# Patient Record
Sex: MALE | Race: WHITE | NOT HISPANIC OR LATINO | Employment: OTHER | URBAN - METROPOLITAN AREA
[De-identification: names, ages, dates, MRNs, and addresses within clinical notes are randomized per-mention and may not be internally consistent; named-entity substitution may affect disease eponyms.]

---

## 2017-03-11 ENCOUNTER — TELEPHONE (OUTPATIENT)
Dept: NURSING | Facility: CLINIC | Age: 67
End: 2017-03-11

## 2017-03-11 NOTE — TELEPHONE ENCOUNTER
Call Type: Triage Call    Presenting Problem:  Diarrhea started 3/8/17.  Currently : denies  fever .  Triage Note:  Guideline Title: Diarrhea or Other Change in Bowel Habits  Recommended Disposition: Activate   Original Inclination: Did not know what to do  Override Disposition:  Intended Action: Go to Hospital / ED  Physician Contacted: No  New or worsening signs and symptoms that may indicate shock ?  YES  Physician Instructions:  Care Advice: IMMEDIATE ACTION

## 2017-03-13 ENCOUNTER — OFFICE VISIT (OUTPATIENT)
Dept: FAMILY MEDICINE | Facility: OTHER | Age: 67
End: 2017-03-13
Payer: COMMERCIAL

## 2017-03-13 ENCOUNTER — TELEPHONE (OUTPATIENT)
Dept: FAMILY MEDICINE | Facility: OTHER | Age: 67
End: 2017-03-13

## 2017-03-13 VITALS
DIASTOLIC BLOOD PRESSURE: 80 MMHG | BODY MASS INDEX: 26.37 KG/M2 | HEIGHT: 67 IN | SYSTOLIC BLOOD PRESSURE: 120 MMHG | RESPIRATION RATE: 12 BRPM | OXYGEN SATURATION: 100 % | HEART RATE: 74 BPM | WEIGHT: 168 LBS | TEMPERATURE: 97.5 F

## 2017-03-13 DIAGNOSIS — Z12.11 SPECIAL SCREENING FOR MALIGNANT NEOPLASMS, COLON: Primary | ICD-10-CM

## 2017-03-13 DIAGNOSIS — R19.7 DIARRHEA OF PRESUMED INFECTIOUS ORIGIN: ICD-10-CM

## 2017-03-13 DIAGNOSIS — A08.4 VIRAL GASTROENTERITIS: Primary | ICD-10-CM

## 2017-03-13 LAB
FLUAV+FLUBV AG SPEC QL: NEGATIVE
FLUAV+FLUBV AG SPEC QL: NORMAL
SPECIMEN SOURCE: NORMAL

## 2017-03-13 PROCEDURE — 99213 OFFICE O/P EST LOW 20 MIN: CPT | Performed by: STUDENT IN AN ORGANIZED HEALTH CARE EDUCATION/TRAINING PROGRAM

## 2017-03-13 PROCEDURE — 87804 INFLUENZA ASSAY W/OPTIC: CPT | Performed by: STUDENT IN AN ORGANIZED HEALTH CARE EDUCATION/TRAINING PROGRAM

## 2017-03-13 ASSESSMENT — PAIN SCALES - GENERAL: PAINLEVEL: NO PAIN (1)

## 2017-03-13 NOTE — TELEPHONE ENCOUNTER
Reason for call:  Other  Reason for Call: Request for an order or referral:    Order or referral being requested: [email or web address]    Date needed: as soon as possible    Has the patient been seen by the PCP for this problem? YES    Additional comments: none    Phone number Patient can be reached at:  Home number on file [phone] (home)    Best Time:  anytime    Can we leave a detailed message on this number?  YES    Call taken on 3/13/2017 at 4:00 PM by Chun Olvera

## 2017-03-13 NOTE — NURSING NOTE
"Chief Complaint   Patient presents with     Flu Symptoms       Initial /80 (BP Location: Left arm, Patient Position: Chair, Cuff Size: Adult Regular)  Pulse 74  Temp 97.5  F (36.4  C) (Temporal)  Resp 12  Ht 5' 7.44\" (1.713 m)  Wt 168 lb (76.2 kg)  SpO2 100%  BMI 25.97 kg/m2 Estimated body mass index is 25.97 kg/(m^2) as calculated from the following:    Height as of this encounter: 5' 7.44\" (1.713 m).    Weight as of this encounter: 168 lb (76.2 kg).  Medication Reconciliation: complete   Danae William CMA      "

## 2017-03-13 NOTE — MR AVS SNAPSHOT
"              After Visit Summary   3/13/2017    Duane Demann    MRN: [id number]           Patient Information     Date Of Birth          1950        Visit Information        Provider Department      3/13/2017 12:30 PM Tiffanie Gupta APRN CNP [facility]        Today's Diagnoses     Viral gastroenteritis    -  1    Diarrhea of presumed infectious origin           Follow-ups after your visit        Who to contact     If you have questions or need follow up information about today's clinic visit or your schedule please contact [facility] directly at [phone].  Normal or non-critical lab and imaging results will be communicated to you by [employer], letter or phone within 4 business days after the clinic has received the results. If you do not hear from us within 7 days, please contact the clinic through [employer] or phone. If you have a critical or abnormal lab result, we will notify you by phone as soon as possible.  Submit refill requests through [employer] or call your pharmacy and they will forward the refill request to us. Please allow 3 business days for your refill to be completed.          Additional Information About Your Visit        MyChart Information     [employer] gives you secure access to your electronic health record. If you see a primary care provider, you can also send messages to your care team and make appointments. If you have questions, please call your primary care clinic.  If you do not have a primary care provider, please call [phone] and they will assist you.        Care EveryWhere ID     This is your Care EveryWhere ID. This could be used by other organizations to access your [place] medical records  [id number]        Your Vitals Were     Pulse Temperature Respirations Height Pulse Oximetry BMI (Body Mass Index)    74 97.5  F (36.4  C) (Temporal) 12 5' 7.44\" (1.713 m) 100% 25.97 kg/m2       Blood Pressure from Last 3 Encounters:   03/13/17 " 120/80   03/09/16 108/62   09/11/15 132/70    Weight from Last 3 Encounters:   03/13/17 168 lb (76.2 kg)   03/09/16 171 lb 12.8 oz (77.9 kg)   09/11/15 171 lb (77.6 kg)              We Performed the Following     Influenza A/B antigen        Primary Care Provider Office Phone # Fax #    Emil Granda PA-C [phone]       [facility] [address] [place] [address]        Thank you!     Thank you for choosing [facility]  for your care. Our goal is always to provide you with excellent care. Hearing back from our patients is one way we can continue to improve our services. Please take a few minutes to complete the written survey that you may receive in the mail after your visit with us. Thank you!             Your Updated Medication List - Protect others around you: Learn how to safely use, store and throw away your medicines at www.disposemymeds.org.          This list is accurate as of: 3/13/17  1:03 PM.  Always use your most recent med list.                   Brand Name Dispense Instructions for use    aspirin 81 MG tablet      Take 81 mg by mouth daily       calcium carbonate 500 MG tablet    OS-JEANNIE 500 mg [place]     unsure of dose, patient will verify next time       GARLIC      unsure of dose, patient will verify next time       multivitamin per tablet     100    ONE DAILY

## 2017-03-13 NOTE — TELEPHONE ENCOUNTER
Patient was just seen today. Last colonoscopy was in 2011. Will route to EM to review/advise.  Maria Del Carmen Coppola, CMA

## 2017-03-13 NOTE — PROGRESS NOTES
"  SUBJECTIVE:                                                    Duane Demann is a 66 year old male who presents to clinic today for the following health issues:      HPI    ABDOMINAL PAIN     Onset: 6 days    Description:   Character: \"solid pain\"  Location: lower abdomen  Radiation: None    Intensity: mild    Progression of Symptoms:  improving    Accompanying Signs & Symptoms:  Fever/Chills?: YES  Gas/Bloating: no   Nausea: YES  Vomitting: no   Diarrhea?: YES  Constipation:YES- felt like it  Dysuria or Hematuria: no    History:   Trauma: no   Previous similar pain: no    Previous tests done: none    Precipitating factors:   Does the pain change with:     Food: no      BM: no     Urination: no     Alleviating factors:  None    Therapies Tried and outcome: None    LMP:  not applicable     Acute Illness   Acute illness concerns:  Flu-like symptoms  Onset: 6 days    Fever: YES    Chills/Sweats: YES    Headache (location?): YES    Sinus Pressure:no    Conjunctivitis:  no    Ear Pain: no    Rhinorrhea: YES- slight    Congestion: no    Sore Throat: no     Cough: no    Wheeze: no    Decreased Appetite: YES    Nausea: YES    Vomiting: no    Diarrhea:  YES    Dysuria/Freq.: no    Fatigue/Achiness: YES    Sick/Strep Exposure: no     Therapies Tried and outcome: Immodium  - helped with loose stools    Problem list and histories reviewed & adjusted, as indicated.  Additional history: as documented    Labs reviewed in EPIC    ROS:  Constitutional, HEENT, cardiovascular, pulmonary, gi and gu systems are negative, except as otherwise noted.    OBJECTIVE:                                                    /80 (BP Location: Left arm, Patient Position: Chair, Cuff Size: Adult Regular)  Pulse 74  Temp 97.5  F (36.4  C) (Temporal)  Resp 12  Ht 5' 7.44\" (1.713 m)  Wt 168 lb (76.2 kg)  SpO2 100%  BMI 25.97 kg/m2  Body mass index is 25.97 kg/(m^2).  GENERAL: healthy, alert and no distress  NECK: no adenopathy, no asymmetry, " masses, or scars and thyroid normal to palpation  RESP: lungs clear to auscultation - no rales, rhonchi or wheezes  CV: regular rate and rhythm, normal S1 S2, no S3 or S4, no murmur, click or rub  ABDOMEN: soft, nontender, no hepatosplenomegaly, no masses and bowel sounds normal  MS: no gross musculoskeletal defects noted  SKIN: no suspicious lesions or rashes  NEURO: Normal strength and tone, mentation intact and speech normal  PSYCH: mentation appears normal, affect normal/bright    Diagnostic Test Results:  Influenza, pending      ASSESSMENT/PLAN:                                                      1. Viral gastroenteritis  Resolving, no longer having diarrhea, fevers or vomiting.  Is eating small amounts and drinking Gatorade.  BP and pulse stable.  Suspect norovirus from symptoms.  Work excuse letter given to patient for work missed 3/9 through 3/13.    2. Diarrhea  - Influenza A/B antigen    ZENOBIA Rascon [facility]

## 2017-03-13 NOTE — LETTER
[address]  [facility] [address]  [phone]  Dept: [phone]      3/13/2017    Re: Duane Demann      TO WHOM IT MAY CONCERN:    Duane Demann  was seen on 3/13/17.  Please excuse him from work on 3/9/17 through 3/13/17 due to severe gastroenteritis.    Cordially,            ZENOBIA Rascon [facility]

## 2017-03-14 ENCOUNTER — TELEPHONE (OUTPATIENT)
Dept: FAMILY MEDICINE | Facility: OTHER | Age: 67
End: 2017-03-14

## 2017-03-14 NOTE — TELEPHONE ENCOUNTER
Placed order for colonoscopy. Please call patient to help him set up.  Thanks,  Tiffanie Gupta, BRITANY-C

## 2017-03-14 NOTE — TELEPHONE ENCOUNTER
Spoke to patient and informed him order has been placed and that someone will be calling him to set this up... Their number to schedule is [phone],

## 2017-03-24 ENCOUNTER — TELEPHONE (OUTPATIENT)
Dept: FAMILY MEDICINE | Facility: OTHER | Age: 67
End: 2017-03-24

## 2017-03-24 NOTE — TELEPHONE ENCOUNTER
Our goal is to have forms completed with 72 hours, however some forms may require a visit or additional information.    Who is the form from?: [facility] (if other please explain)  Where the form came from: Patient or family brought in     What clinic location was the form placed at?: [place]  Where the form was placed: 's Box  What number is listed as a contact on the form?: none    Phone call message- patient request for a letter, form or note:    Date needed: as soon as possible  Please call the patient when completed  Has the patient signed a consent form for release of information? NO    Additional comments:     Call taken on 3/24/2017 at 10:04 AM by Shira Kong    Type of letter, form or note: medical

## 2017-04-03 ENCOUNTER — SURGERY (OUTPATIENT)
Age: 67
End: 2017-04-03

## 2017-04-03 ENCOUNTER — HOSPITAL ENCOUNTER (OUTPATIENT)
Facility: CLINIC | Age: 67
Discharge: HOME | End: 2017-04-03
Attending: INTERNAL MEDICINE | Admitting: INTERNAL MEDICINE
Payer: COMMERCIAL

## 2017-04-03 VITALS
OXYGEN SATURATION: 94 % | TEMPERATURE: 97.4 F | DIASTOLIC BLOOD PRESSURE: 93 MMHG | RESPIRATION RATE: 13 BRPM | SYSTOLIC BLOOD PRESSURE: 110 MMHG

## 2017-04-03 LAB — COLONOSCOPY: NORMAL

## 2017-04-03 PROCEDURE — 40000296 ZZH STATISTIC ENDO RECOVERY CLASS 1:2 FIRST HOUR: Performed by: INTERNAL MEDICINE

## 2017-04-03 PROCEDURE — 88305 TISSUE EXAM BY PATHOLOGIST: CPT | Performed by: INTERNAL MEDICINE

## 2017-04-03 PROCEDURE — 25000128 H RX IP 250 OP 636: Performed by: INTERNAL MEDICINE

## 2017-04-03 PROCEDURE — 45380 COLONOSCOPY AND BIOPSY: CPT | Performed by: INTERNAL MEDICINE

## 2017-04-03 PROCEDURE — 25000125 ZZHC RX 250: Performed by: INTERNAL MEDICINE

## 2017-04-03 PROCEDURE — 88305 TISSUE EXAM BY PATHOLOGIST: CPT | Mod: 26 | Performed by: INTERNAL MEDICINE

## 2017-04-03 RX ORDER — ONDANSETRON 2 MG/ML
4 INJECTION INTRAMUSCULAR; INTRAVENOUS
Status: DISCONTINUED | OUTPATIENT
Start: 2017-04-03 | End: 2017-04-03 | Stop reason: HOSPADM

## 2017-04-03 RX ORDER — LIDOCAINE 40 MG/G
CREAM TOPICAL
Status: DISCONTINUED | OUTPATIENT
Start: 2017-04-03 | End: 2017-04-03 | Stop reason: HOSPADM

## 2017-04-03 RX ORDER — FENTANYL CITRATE 50 UG/ML
INJECTION, SOLUTION INTRAMUSCULAR; INTRAVENOUS PRN
Status: DISCONTINUED | OUTPATIENT
Start: 2017-04-03 | End: 2017-04-03 | Stop reason: HOSPADM

## 2017-04-03 RX ADMIN — MIDAZOLAM HYDROCHLORIDE 1 MG: 1 INJECTION, SOLUTION INTRAMUSCULAR; INTRAVENOUS at 13:01

## 2017-04-03 RX ADMIN — MIDAZOLAM HYDROCHLORIDE 2 MG: 1 INJECTION, SOLUTION INTRAMUSCULAR; INTRAVENOUS at 12:58

## 2017-04-03 RX ADMIN — MIDAZOLAM HYDROCHLORIDE 1 MG: 1 INJECTION, SOLUTION INTRAMUSCULAR; INTRAVENOUS at 12:59

## 2017-04-03 RX ADMIN — FENTANYL CITRATE 50 MCG: 50 INJECTION, SOLUTION INTRAMUSCULAR; INTRAVENOUS at 12:58

## 2017-04-03 RX ADMIN — LIDOCAINE HYDROCHLORIDE 0.1 ML: 10 INJECTION, SOLUTION EPIDURAL; INFILTRATION; INTRACAUDAL; PERINEURAL at 12:25

## 2017-04-03 RX ADMIN — MIDAZOLAM HYDROCHLORIDE 1 MG: 1 INJECTION, SOLUTION INTRAMUSCULAR; INTRAVENOUS at 13:00

## 2017-04-03 NOTE — DISCHARGE INSTRUCTIONS
[facility] Discharge Instructions     Discharge disposition:  Discharged to home       Diet:  Regular       Activity Activity as tolerated       Follow-up: with Primary Physician PRN       Additional instructions: None

## 2017-04-03 NOTE — CONSULTS
[facility] GI Pre-Procedure Physical Assessment    Duane Demann MRN# [id number]   Age: 66 year old YOB: 1950      Date of Surgery: 4/3/2017  Location [facility]      Date of Exam 4/3/2017 Facility (Same day)         Primary care provider: Tiffanie Gupta         Active problem list:   Patient Active Problem List   Diagnosis     CARDIOVASCULAR SCREENING; LDL GOAL LESS THAN 130     Advanced directives, counseling/discussion     Trigger finger, acquired            Medications (include herbals and vitamins):   Any Plavix use in the last 7 days?  No     Current Facility-Administered Medications   Medication     lidocaine 1 % 1 mL     lidocaine (LMX4) kit     sodium chloride (PF) 0.9% PF flush 3 mL     sodium chloride (PF) 0.9% PF flush 3 mL     sodium chloride (PF) 0.9% PF flush 3 mL     ondansetron (ZOFRAN) injection 4 mg             Allergies:      Allergies   Allergen Reactions     No Known Drug Allergies      Allergy to Latex?  No  Allergy to tape?    No          Social History:     Social History   Substance Use Topics     Smoking status: Former Smoker     Packs/day: 1.00     Years: 12.00     Types: Cigarettes     Smokeless tobacco: Not on file      Comment: from 1968 until 1980,      Alcohol use 0.0 oz/week     0 Standard drinks or equivalent per week      Comment: probably three at a time, once every month            Physical Exam:   All vitals have been reviewed  Blood pressure (!) 145/97, temperature 97.4  F (36.3  C), temperature source Oral, resp. rate 16, SpO2 98 %.  Airway assessment:   Patient is able to open mouth wide      Lungs:   No increased work of breathing, good air exchange, clear to auscultation bilaterally, no crackles or wheezing      Cardiovascular:   Normal apical impulse, regular rate and rhythm, normal S1 and S2, no S3 or S4, and no murmur noted           Lab / Radiology Results:   All laboratory data reviewed          Assessment:    Appropriately NPO  Chief complaint or anatomic assessment of involved area: recent history of diarrhea; screening         Plan:   Moderate (conscious) sedation     Patient's active problems diagnostically and therapeutically optimized for the planned procedure  Risks, benefits, alternatives to sedation and blood explained and consent obtained  Risks, benefits, alternatives to procedure explained and consent obtained  P1 (normal healthy patient)  Orders and progress notes are in the chart  Discharge from Phase 1 and / or Phase 2 recovery when patient meets criteria    I have reviewed the history and physical, lab finding(s), diagnostic data, medicaitons, and the plan for sedation.  I have determined this patient to be an appropriate candidate for the planned sedation / procedure and have reassessed the patient immediately prior to sedation / procedure.    I have personally and medically directed the administration of medications used.    Issac Barrera MD

## 2017-04-05 LAB — COPATH REPORT: NORMAL

## 2019-03-20 NOTE — PATIENT INSTRUCTIONS
For high blood pressure two options for medications - losartan or hydrochlorothiazide (water pill)    Message me if you choose to start medication.    Check with insurance if screening for abdominal aortic aneurysm is covered. You can call to schedule this at [phone].    Consider getting the shingles vaccine which is generally cheaper to have done at the pharmacy.    Tiffanie Gupta, NP-C    Preventive Health Recommendations:     See your health care provider every year to    Review health changes.     Discuss preventive care.      Review your medicines if your doctor has prescribed any.      Talk with your health care provider about whether you should have a test to screen for prostate cancer (PSA).    Every 3 years, have a diabetes test (fasting glucose). If you are at risk for diabetes, you should have this test more often.    Every 5 years, have a cholesterol test. Have this test more often if you are at risk for high cholesterol or heart disease.     Every 10 years, have a colonoscopy. Or, have a yearly FIT test (stool test). These exams will check for colon cancer.    Talk to with your health care provider about screening for Abdominal Aortic Aneurysm if you have a family history of AAA or have a history of smoking.    Shots:     Get a flu shot each year.     Get a tetanus shot every 10 years.     Talk to your doctor about your pneumonia vaccines. There are now two you should receive - Pneumovax (PPSV 23) and Prevnar (PCV 13).     Talk to your pharmacist about a shingles vaccine.     Talk to your doctor about the hepatitis B vaccine.  Nutrition:     Eat at least 5 servings of fruits and vegetables each day.     Eat whole-grain bread, whole-wheat pasta and brown rice instead of white grains and rice.     Get adequate Calcium and Vitamin D.   Lifestyle    Exercise for at least 150 minutes a week (30 minutes a day, 5 days a week). This will help you control your weight and prevent disease.     Limit alcohol  to one drink per day.     No smoking.     Wear sunscreen to prevent skin cancer.    See your dentist every six months for an exam and cleaning.    See your eye doctor every 1 to 2 years to screen for conditions such as glaucoma, macular degeneration, cataracts, etc.    Personalized Prevention Plan  You are due for the preventive services outlined below.  Your care team is available to assist you in scheduling these services.  If you have already completed any of these items, please share that information with your care team to update in your medical record.  Health Maintenance Due   Topic Date Due     Hepatitis C Screening  09/02/1970     Zoster (Shingles) Vaccine (1 of 2) 09/02/2002     Annual Wellness Visit  09/02/2017     FALL RISK ASSESSMENT  09/02/2017     Pneumococcal Vaccine (1 of 2 - PCV13) 09/02/2017     AORTIC ANEURYSM SCREENING (SYSTEM ASSIGNED)  09/02/2017     Depression Assessment 2 - yearly  03/13/2018     Flu Vaccine (1) 09/01/2018     Discuss Advance Directive Planning  03/17/2019     Cholesterol Lab - every 5 years  03/17/2019

## 2019-03-20 NOTE — PROGRESS NOTES
"SUBJECTIVE:   CC: Duane Demann is an 68 year old male who presents for preventative health visit.     HPI  {Add if <65 person on Medicare  - Required Questions (Optional):864581}  {Outside tests to abstract? :818542}    {additional problems to add (Optional):666791}    Today's PHQ-2 Score:   PHQ-2 ( 1999 Pfizer) 3/13/2017   Q1: Little interest or pleasure in doing things 0   Q2: Feeling down, depressed or hopeless 0   PHQ-2 Score 0       Abuse: Current or Past(Physical, Sexual or Emotional)- {YES/NO/NA:879847}  Do you feel safe in your environment? {YES/NO/NA:972962}    Social History     Tobacco Use     Smoking status: Former Smoker     Packs/day: 1.00     Years: 12.00     Pack years: 12.00     Types: Cigarettes     Tobacco comment: from 1968 until 1980,    Substance Use Topics     Alcohol use: Yes     Alcohol/week: 0.0 oz     Comment: probably three at a time, once every month     No flowsheet data found.{add AUDIT responses (Optional) (A score of 7 for adult men is an indication of hazardous drinking; a score of 8 or more is an indication of an alcohol use disorder.  A score of 7 or more for adult women is an indication of hazardous drinking or an alchohol use disorder):581348}    Last PSA:   PSA   Date Value Ref Range Status   03/17/2014 2.01 0 - 4 ug/L Final       Reviewed orders with patient. Reviewed health maintenance and updated orders accordingly - {Yes/No:836801::\"Yes\"}  {Chronicprobdata (Optional):981844}    Reviewed and updated as needed this visit by clinical staff         Reviewed and updated as needed this visit by Provider        {HISTORY OPTIONS (Optional):121964}    Review of Systems  {MALE ROS (Optional):611343::\"CONSTITUTIONAL: NEGATIVE for fever, chills, change in weight\",\"INTEGUMENTARY/SKIN: NEGATIVE for worrisome rashes, moles or lesions\",\"EYES: NEGATIVE for vision changes or irritation\",\"ENT: NEGATIVE for ear, mouth and throat problems\",\"RESP: NEGATIVE for significant cough or SOB\",\"CV: " "NEGATIVE for chest pain, palpitations or peripheral edema\",\"GI: NEGATIVE for nausea, abdominal pain, heartburn, or change in bowel habits\",\" male: negative for dysuria, hematuria, decreased urinary stream, erectile dysfunction, urethral discharge\",\"MUSCULOSKELETAL: NEGATIVE for significant arthralgias or myalgia\",\"NEURO: NEGATIVE for weakness, dizziness or paresthesias\",\"PSYCHIATRIC: NEGATIVE for changes in mood or affect\"}    OBJECTIVE:   There were no vitals taken for this visit.    Physical Exam  {Exam Choices (Optional):054229}    {Diagnostic Test Results (Optional):944864::\"Diagnostic Test Results:\",\"none \"}    ASSESSMENT/PLAN:   {Dia Picklist:637781}    COUNSELING:   {MALE COUNSELING MESSAGES:237272::\"Reviewed preventive health counseling, as reflected in patient instructions\"}    BP Readings from Last 1 Encounters:   04/03/17 (!) 110/93     Estimated body mass index is 25.97 kg/m  as calculated from the following:    Height as of 3/13/17: 1.713 m (5' 7.44\").    Weight as of 3/13/17: 76.2 kg (168 lb).    {BP Counseling- Complete if BP >= 120/80  (Optional):717202}  {Weight Management Plan (ACO) Complete if BMI is abnormal-  Ages 18-64  BMI >24.9.  Age 65+ with BMI <23 or >30 (Optional):906754}     reports that he has quit smoking. His smoking use included cigarettes. He has a 12.00 pack-year smoking history. He does not have any smokeless tobacco history on file.  {Tobacco Cessation -- Complete if patient is a smoker (Optional):512159}    Counseling Resources:  ATP IV Guidelines  Pooled Cohorts Equation Calculator  FRAX Risk Assessment  ICSI Preventive Guidelines  Dietary Guidelines for Americans, 2010  USDA's MyPlate  ASA Prophylaxis  Lung CA Screening    ZENOBIA Rascon [facility]"

## 2019-03-21 ASSESSMENT — ENCOUNTER SYMPTOMS
HEADACHES: 0
NERVOUS/ANXIOUS: 0
CHILLS: 0
MYALGIAS: 1
HEARTBURN: 0
COUGH: 1
EYE PAIN: 0
NAUSEA: 0
PARESTHESIAS: 0
PALPITATIONS: 0
HEMATOCHEZIA: 0
FEVER: 0
HEMATURIA: 0
WEAKNESS: 0
DIZZINESS: 0
DYSURIA: 0
SHORTNESS OF BREATH: 0
ABDOMINAL PAIN: 0
DIARRHEA: 0
CONSTIPATION: 0
FREQUENCY: 1
SORE THROAT: 0
ARTHRALGIAS: 1
JOINT SWELLING: 0

## 2019-03-21 ASSESSMENT — ACTIVITIES OF DAILY LIVING (ADL): CURRENT_FUNCTION: NO ASSISTANCE NEEDED

## 2019-03-22 ENCOUNTER — OFFICE VISIT (OUTPATIENT)
Dept: FAMILY MEDICINE | Facility: OTHER | Age: 69
End: 2019-03-22
Payer: COMMERCIAL

## 2019-03-22 VITALS
SYSTOLIC BLOOD PRESSURE: 150 MMHG | HEIGHT: 67 IN | BODY MASS INDEX: 26.24 KG/M2 | WEIGHT: 167.2 LBS | HEART RATE: 68 BPM | DIASTOLIC BLOOD PRESSURE: 110 MMHG | TEMPERATURE: 97.1 F | RESPIRATION RATE: 12 BRPM

## 2019-03-22 DIAGNOSIS — Z00.00 ENCOUNTER FOR ROUTINE ADULT HEALTH EXAMINATION WITHOUT ABNORMAL FINDINGS: Primary | ICD-10-CM

## 2019-03-22 DIAGNOSIS — I10 UNCONTROLLED HYPERTENSION: ICD-10-CM

## 2019-03-22 DIAGNOSIS — Z13.6 SCREENING FOR AAA (ABDOMINAL AORTIC ANEURYSM): ICD-10-CM

## 2019-03-22 DIAGNOSIS — Z13.1 SCREENING FOR DIABETES MELLITUS: ICD-10-CM

## 2019-03-22 DIAGNOSIS — Z87.891 HISTORY OF TOBACCO USE, PRESENTING HAZARDS TO HEALTH: ICD-10-CM

## 2019-03-22 DIAGNOSIS — Z23 NEED FOR VACCINATION: ICD-10-CM

## 2019-03-22 DIAGNOSIS — Z13.6 ENCOUNTER FOR SCREENING FOR CARDIOVASCULAR DISORDERS: ICD-10-CM

## 2019-03-22 LAB
ANION GAP SERPL CALCULATED.3IONS-SCNC: 7 MMOL/L (ref 3–14)
BUN SERPL-MCNC: 16 MG/DL (ref 7–30)
CALCIUM SERPL-MCNC: 9 MG/DL (ref 8.5–10.1)
CHLORIDE SERPL-SCNC: 105 MMOL/L (ref 94–109)
CHOLEST SERPL-MCNC: 194 MG/DL
CO2 SERPL-SCNC: 26 MMOL/L (ref 20–32)
CREAT SERPL-MCNC: 0.88 MG/DL (ref 0.66–1.25)
GFR SERPL CREATININE-BSD FRML MDRD: 89 ML/MIN/{1.73_M2}
GLUCOSE SERPL-MCNC: 91 MG/DL (ref 70–99)
HDLC SERPL-MCNC: 67 MG/DL
LDLC SERPL CALC-MCNC: 112 MG/DL
NONHDLC SERPL-MCNC: 127 MG/DL
POTASSIUM SERPL-SCNC: 4.4 MMOL/L (ref 3.4–5.3)
SODIUM SERPL-SCNC: 138 MMOL/L (ref 133–144)
TRIGL SERPL-MCNC: 74 MG/DL

## 2019-03-22 PROCEDURE — 80061 LIPID PANEL: CPT | Performed by: STUDENT IN AN ORGANIZED HEALTH CARE EDUCATION/TRAINING PROGRAM

## 2019-03-22 PROCEDURE — 90732 PPSV23 VACC 2 YRS+ SUBQ/IM: CPT | Performed by: STUDENT IN AN ORGANIZED HEALTH CARE EDUCATION/TRAINING PROGRAM

## 2019-03-22 PROCEDURE — 80048 BASIC METABOLIC PNL TOTAL CA: CPT | Performed by: STUDENT IN AN ORGANIZED HEALTH CARE EDUCATION/TRAINING PROGRAM

## 2019-03-22 PROCEDURE — 90715 TDAP VACCINE 7 YRS/> IM: CPT | Performed by: STUDENT IN AN ORGANIZED HEALTH CARE EDUCATION/TRAINING PROGRAM

## 2019-03-22 PROCEDURE — G0402 INITIAL PREVENTIVE EXAM: HCPCS | Mod: 25 | Performed by: STUDENT IN AN ORGANIZED HEALTH CARE EDUCATION/TRAINING PROGRAM

## 2019-03-22 PROCEDURE — 90472 IMMUNIZATION ADMIN EACH ADD: CPT | Mod: GY | Performed by: STUDENT IN AN ORGANIZED HEALTH CARE EDUCATION/TRAINING PROGRAM

## 2019-03-22 PROCEDURE — G0009 ADMIN PNEUMOCOCCAL VACCINE: HCPCS | Mod: 59 | Performed by: STUDENT IN AN ORGANIZED HEALTH CARE EDUCATION/TRAINING PROGRAM

## 2019-03-22 PROCEDURE — 36415 COLL VENOUS BLD VENIPUNCTURE: CPT | Performed by: STUDENT IN AN ORGANIZED HEALTH CARE EDUCATION/TRAINING PROGRAM

## 2019-03-22 ASSESSMENT — ENCOUNTER SYMPTOMS
FEVER: 0
SHORTNESS OF BREATH: 0
HEMATOCHEZIA: 0
DIZZINESS: 0
MYALGIAS: 1
HEADACHES: 0
DIARRHEA: 0
FREQUENCY: 1
NAUSEA: 0
JOINT SWELLING: 0
NERVOUS/ANXIOUS: 0
PALPITATIONS: 0
WEAKNESS: 0
CHILLS: 0
HEARTBURN: 0
ABDOMINAL PAIN: 0
EYE PAIN: 0
DYSURIA: 0
COUGH: 1
ARTHRALGIAS: 1
CONSTIPATION: 0
HEMATURIA: 0
PARESTHESIAS: 0
SORE THROAT: 0

## 2019-03-22 ASSESSMENT — MIFFLIN-ST. JEOR: SCORE: 1504.04

## 2019-03-22 ASSESSMENT — ACTIVITIES OF DAILY LIVING (ADL): CURRENT_FUNCTION: NO ASSISTANCE NEEDED

## 2019-03-22 NOTE — PROGRESS NOTES
"SUBJECTIVE:   Duane Demann is a 68 year old male who presents for Preventive Visit.    Are you in the first 12 months of your Medicare coverage?  Yes,  Visual Acuity:  Right Eye: 20/63   Left Eye: 20/32  Both Eyes: 20/32    Annual Wellness Visit     In general, how would you rate your overall health?  Good    Frequency of exercise:  1 day/week    Do you usually eat at least 4 servings of fruit and vegetables a day, include whole grains    & fiber and avoid regularly eating high fat or \"junk\" foods?  No    Taking medications regularly:  Yes    Medication side effects:  None    Ability to successfully perform activities of daily living:  No assistance needed    Home Safety:  No safety concerns identified    Hearing Impairment:  No hearing concerns    In the past 6 months, have you been bothered by leaking of urine?  No    In general, how would you rate your overall mental or emotional health?  Good    PHQ-2 Total Score: 1    Additional concerns today:  Yes (Pain on left side once in a while, ringing in head)      Do you feel safe in your environment? Yes    Do you have a Health Care Directive? No: Advance care planning was reviewed with patient; patient declined at this time.    Fall risk  Fallen 2 or more times in the past year?: No  Any fall with injury in the past year?: No    Cognitive Screening   1) Repeat 3 items (Leader, Season, Table)    2) Clock draw: NORMAL  3) 3 item recall: Recalls 2 objects   Results: 3 items recalled: COGNITIVE IMPAIRMENT LESS LIKELY    Mini-CogTM Copyright S Arianna. Licensed by the author for use in [facility]; reprinted with permission (romaine@.[email or web address]). All rights reserved.      Do you have sleep apnea, excessive snoring or daytime drowsiness?: yes    Reviewed and updated as needed this visit by clinical staff  Tobacco  Allergies  Meds  Med Hx  Surg Hx  Fam Hx  Soc Hx        Reviewed and updated as needed this visit by Provider        Social History     Tobacco Use     " "Smoking status: Former Smoker     Packs/day: 1.00     Years: 12.00     Pack years: 12.00     Types: Cigarettes     Smokeless tobacco: Never Used     Tobacco comment: from 1968 until 1980,    Substance Use Topics     Alcohol use: Yes     Alcohol/week: 0.0 oz     Comment: probably three at a time, once every month       Alcohol Use 3/21/2019   If you drink alcohol do you typically have greater than 3 drinks per day OR greater than 7 drinks per week? No   No flowsheet data found.      Joint Pain    Onset: \"It been a while\"    Description:   Location: left side  Character: Burning    Intensity: mild    Progression of Symptoms: intermittent    Accompanying Signs & Symptoms:  Other symptoms: none    History:   Previous similar pain: no       Precipitating factors:   Trauma or overuse: no     Alleviating factors:  Improved by: nothing    Therapies Tried and outcome: none        Current providers sharing in care for this patient include:   Patient Care Team:  Tiffanie Gupta APRN CNP as PCP - General (Nurse Practitioner - Family)  Tiffanie Gupta APRN CNP as Assigned PCP    The following health maintenance items are reviewed in Epic and correct as of today:  Health Maintenance   Topic Date Due     HEPATITIS C SCREENING  09/02/1970     ZOSTER IMMUNIZATION (1 of 2) 09/02/2002     MEDICARE ANNUAL WELLNESS VISIT  09/02/2017     FALL RISK ASSESSMENT  09/02/2017     PNEUMOCOCCAL IMMUNIZATION 65+ LOW/MEDIUM RISK (1 of 2 - PCV13) 09/02/2017     AORTIC ANEURYSM SCREENING (SYSTEM ASSIGNED)  09/02/2017     PHQ-2 Q1 YR  03/13/2018     INFLUENZA VACCINE (1) 09/01/2018     ADVANCE DIRECTIVE PLANNING Q5 YRS  03/17/2019     LIPID SCREEN Q5 YR MALE (SYSTEM ASSIGNED)  03/17/2019     DTAP/TDAP/TD IMMUNIZATION (2 - Td) 11/09/2019     COLON CANCER SCREEN (SYSTEM ASSIGNED)  04/03/2027     IPV IMMUNIZATION  Aged Out     MENINGITIS IMMUNIZATION  Aged Out     Labs reviewed in EPIC  BP Readings from Last 3 Encounters: "   19 (!) 150/110   17 (!) 110/93   17 120/80    Wt Readings from Last 3 Encounters:   19 75.8 kg (167 lb 3.2 oz)   17 76.2 kg (168 lb)   16 77.9 kg (171 lb 12.8 oz)                  Patient Active Problem List   Diagnosis     CARDIOVASCULAR SCREENING; LDL GOAL LESS THAN 130     Advanced directives, counseling/discussion     Trigger finger, acquired     Past Surgical History:   Procedure Laterality Date     C APPENDECTOMY       C STRESS ECHO TEST NL      [facility]     COLONOSCOPY  2011    COLONOSCOPY performed by TEDDY GARCIA at  GI     COLONOSCOPY N/A 4/3/2017    Procedure: COMBINED COLONOSCOPY, SINGLE OR MULTIPLE BIOPSY/POLYPECTOMY BY BIOPSY;  Surgeon: Issac Barrera MD;  Location:  GI     HC REVISE MEDIAN N/CARPAL TUNNEL SURG      Bilateral     HC VASECTOMY UNILAT/BILAT W POSTOP SEMEN  1996    Vasectomy     HERNIORRHAPHY INGUINAL Right 2015    Procedure: HERNIORRHAPHY INGUINAL;  Surgeon: Domingo Ramsay MD;  Location:  OR     SIGMOIDOSCOPY,BIOPSY   or so    reported as [facility]       Social History     Tobacco Use     Smoking status: Former Smoker     Packs/day: 1.00     Years: 12.00     Pack years: 12.00     Types: Cigarettes     Smokeless tobacco: Never Used     Tobacco comment: from  until ,    Substance Use Topics     Alcohol use: Yes     Alcohol/week: 0.0 oz     Comment: probably three at a time, once every month     Family History   Problem Relation Age of Onset     Respiratory Mother         Emphysema- she was a smoker,  at 81 yo     Hypertension Sister      Hypertension Brother      Lipids Brother      Unknown/Adopted Father      Hypertension Brother      Lipids Brother      Hypertension Sister      Asthma No family hx of      C.A.D. No family hx of      Diabetes No family hx of      Cerebrovascular Disease No family hx of      Breast Cancer No family hx of      Cancer -  colorectal No family hx of      Prostate Cancer No family hx of      Cancer No family hx of      Cardiovascular No family hx of      Blood Disease No family hx of      Arthritis No family hx of      Alzheimer Disease No family hx of      Circulatory No family hx of      Alcohol/Drug No family hx of      Eye Disorder No family hx of      Depression No family hx of      Gastrointestinal Disease No family hx of      Genitourinary Problems No family hx of      Heart Disease No family hx of      Musculoskeletal Disorder No family hx of      Neurologic Disorder No family hx of      Thyroid Disease No family hx of          Current Outpatient Medications   Medication Sig Dispense Refill     aspirin 81 MG tablet Take 81 mg by mouth daily       CALCIUM 500 MG PO TABS unsure of dose, patient will verify next time       GARLIC unsure of dose, patient will verify next time       MULTIVITAMINS OR TABS ONE DAILY 100 3     Allergies   Allergen Reactions     No Known Drug Allergies      Pneumonia Vaccine:Adults age 65+ who received their first dose of Pneumovax (PPSV23) prior to age 65 years: Should be given PCV 13 > 1 year after their most recent PPSV23 AND should be given a another dose of PPSV23 > 5 years after their most recent dose of PPSV23    Review of Systems   Constitutional: Negative for chills and fever.   HENT: Negative for congestion, ear pain, hearing loss and sore throat.    Eyes: Negative for pain and visual disturbance.   Respiratory: Positive for cough. Negative for shortness of breath.    Cardiovascular: Negative for chest pain, palpitations and peripheral edema.   Gastrointestinal: Negative for abdominal pain, constipation, diarrhea, heartburn, hematochezia and nausea.   Genitourinary: Positive for frequency and impotence. Negative for discharge, dysuria, genital sores, hematuria and urgency.   Musculoskeletal: Positive for arthralgias and myalgias. Negative for joint swelling.   Skin: Negative for rash.  "  Neurological: Negative for dizziness, weakness, headaches and paresthesias.   Psychiatric/Behavioral: Negative for mood changes. The patient is not nervous/anxious.      Constitutional, HEENT, cardiovascular, pulmonary, GI, , musculoskeletal, neuro, skin, endocrine and psych systems are negative, except as otherwise noted.    OBJECTIVE:   BP (!) 150/110   Pulse 68   Temp 97.1  F (36.2  C) (Temporal)   Resp 12   Ht 1.713 m (5' 7.44\")   Wt 75.8 kg (167 lb 3.2 oz)   BMI 25.85 kg/m   Estimated body mass index is 25.85 kg/m  as calculated from the following:    Height as of this encounter: 1.713 m (5' 7.44\").    Weight as of this encounter: 75.8 kg (167 lb 3.2 oz).  Physical Exam  GENERAL: alert and no distress  EYES: Eyes grossly normal to inspection, PERRL and conjunctivae and sclerae normal  HENT: ear canals and TM's normal, nose and mouth without ulcers or lesions  NECK: no adenopathy, no asymmetry, masses, or scars and thyroid normal to palpation  RESP: lungs clear to auscultation - no rales, rhonchi or wheezes  CV: regular rate and rhythm, normal S1 S2, no S3 or S4, no murmur, click or rub, trace BLE peripheral edema and peripheral pulses strong  ABDOMEN: soft, nontender, no hepatosplenomegaly, no masses and bowel sounds normal  MS: no gross musculoskeletal defects noted, no edema  SKIN: no suspicious lesions or rashes  NEURO: Normal strength and tone, mentation intact and speech normal  PSYCH: mentation appears normal, affect normal/bright    Diagnostic Test Results:  No results found for this or any previous visit (from the past 24 hour(s)).    ASSESSMENT / PLAN:   1. Encounter for routine adult health examination without abnormal findings  See notes    2. Uncontrolled hypertension  Recommend treating with losartan or hydrochlorothiazide. Patient does not want to take medication as he states \"then I am on it for life\". I explained the potential serious complications of untreated hypertension including " "stroke, MI, kidney disease, heart failure. Patient verbalized understanding. I explained that he has a strong family history of hypertension in all of his siblings and that although he has worked on staying healthy through diet and exercise, genetics cannot be overcome sometimes and medication is the only choice. Patient still declines medication but states he will think about it and send me a message if he decides to start medication.   - Basic metabolic panel    3. Encounter for screening for cardiovascular disorders  - Lipid panel reflex to direct LDL Fasting    4. Screening for diabetes mellitus  - Basic metabolic panel    5. History of tobacco use, presenting hazards to health  -  Aorta Medicare AAA Screening; Future    6. Screening for AAA (abdominal aortic aneurysm)  - [place] Aorta Medicare AAA Screening; Future    7. Need for vaccination  - Pneumococcal vaccine 23 valent PPSV23  (Pneumovax) [28923]  - ADMIN: Vaccine, Initial (81758)  - TDAP VACCINE (ADACEL) [21520.002]    End of Life Planning:  Patient currently has an advanced directive: No.  I have verified the patient's ablity to prepare an advanced directive/make health care decisions.  Literature was provided to assist patient in preparing an advanced directive.    COUNSELING:  Reviewed preventive health counseling, as reflected in patient instructions       Regular exercise       Healthy diet/nutrition       Immunizations    Vaccinated for: Pneumococcal and TDAP             Aspirin Prophylaxsis    BP Readings from Last 1 Encounters:   03/22/19 (!) 150/110     Estimated body mass index is 25.85 kg/m  as calculated from the following:    Height as of this encounter: 1.713 m (5' 7.44\").    Weight as of this encounter: 75.8 kg (167 lb 3.2 oz).      Weight management plan: Discussed healthy diet and exercise guidelines     reports that he has quit smoking. His smoking use included cigarettes. He has a 12.00 pack-year smoking history. he has never used " smokeless tobacco.      Appropriate preventive services were discussed with this patient, including applicable screening as appropriate for cardiovascular disease, diabetes, osteopenia/osteoporosis, and glaucoma.  As appropriate for age/gender, discussed screening for colorectal cancer, prostate cancer, breast cancer, and cervical cancer. Checklist reviewing preventive services available has been given to the patient.    Reviewed patients plan of care and provided an AVS. The Basic Care Plan (routine screening as documented in Health Maintenance) for Duane meets the Care Plan requirement. This Care Plan has been established and reviewed with the Patient.    Counseling Resources:  ATP IV Guidelines  Pooled Cohorts Equation Calculator  Breast Cancer Risk Calculator  FRAX Risk Assessment  ICSI Preventive Guidelines  Dietary Guidelines for Americans, 2010  USDA's MyPlate  ASA Prophylaxis  Lung CA Screening    ZENOBIA Rascon [facility]

## 2019-03-22 NOTE — NURSING NOTE
Screening Questionnaire for Adult Immunization    Are you sick today?   No   Do you have allergies to medications, food, a vaccine component or latex?   No   Have you ever had a serious reaction after receiving a vaccination?   No   Do you have a long-term health problem with heart disease, lung disease, asthma, kidney disease, metabolic disease (e.g. diabetes), anemia, or other blood disorder?   No   Do you have cancer, leukemia, HIV/AIDS, or any other immune system problem?   No   In the past 3 months, have you taken medications that affect  your immune system, such as prednisone, other steroids, or anticancer drugs; drugs for the treatment of rheumatoid arthritis, Crohn s disease, or psoriasis; or have you had radiation treatments?   No   Have you had a seizure, or a brain or other nervous system problem?   No   During the past year, have you received a transfusion of blood or blood     products, or been given immune (gamma) globulin or antiviral drug?   No   For women: Are you pregnant or is there a chance you could become        pregnant during the next month?   No   Have you received any vaccinations in the past 4 weeks?   No     Immunization questionnaire answers were all negative.        Per orders of Tiffanie Gupta, injection of Tdap and Pneumovax 23 given by Danae William. Patient instructed to remain in clinic for 15 minutes afterwards, and to report any adverse reaction to me immediately.       Screening performed by Danae William on 3/22/2019 at 9:43 AM.

## 2019-06-08 ENCOUNTER — TRANSFERRED RECORDS (OUTPATIENT)
Dept: HEALTH INFORMATION MANAGEMENT | Facility: CLINIC | Age: 69
End: 2019-06-08

## 2019-12-08 ENCOUNTER — HEALTH MAINTENANCE LETTER (OUTPATIENT)
Age: 69
End: 2019-12-08

## 2019-12-10 NOTE — H&P (VIEW-ONLY)
"Subjective     Duane Demann is a 69 year old male who presents to clinic today for the following health issues:    HPI   Concern - weight loss and fatigue  Onset: months ago    Description:   Noticed skin sagging in mirror and belt loosening hasn't changed things to loose this much weight     Intensity: mild-severe    Progression of Symptoms:  worsening    Accompanying Signs & Symptoms:  none    Previous history of similar problem:   none    Precipitating factors:   Worsened by: none    Alleviating factors:  Improved by: none  Therapies Tried and outcome: none    - Physical in March   - No meds   - Eating the same, less bread   - No change to bowel habits   - Pressure in epigastric area   - Feels under the weather all the time   - Tired a little bit all the time   - Gets chills a lot   - Sometimes night sweats       Review of Systems   ROS COMP: Constitutional, HEENT, cardiovascular, pulmonary, gi and gu systems are negative, except as otherwise noted.      Objective    /88   Pulse 90   Temp 97.8  F (36.6  C) (Temporal)   Ht 1.715 m (5' 7.52\")   Wt 65 kg (143 lb 3.2 oz)   SpO2 99%   BMI 22.08 kg/m    Body mass index is 22.08 kg/m .  Physical Exam   GENERAL APPEARANCE: healthy, alert and no distress  EYES: Eyes grossly normal to inspection, PERRLA, conjunctivae and sclerae without injection or discharge, EOM intact   RESP: Lungs clear to auscultation - no rales, rhonchi or wheezes    CV: Regular rates and rhythm, normal S1 S2, no S3 or S4, no murmur, click or rub, no peripheral edema and peripheral pulses strong and symmetric bilaterally   LYMPH: No bilateral cervical, supraclavicular , axillary, or inguinal lymphadenopathy   ABD: Normal bowel sounds, not tender, no masses or hepatosplenomegaly   MS: No musculoskeletal defects are noted and gait is age appropriate without ataxia   SKIN: No suspicious lesions or rashes, hydration status appears adeuqate with normal skin turgor   PSYCH: Alert and oriented " x3; speech- coherent , normal rate and volume; able to articulate logical thoughts, able to abstract reason, no tangential thoughts, no hallucinations or delusions, mentation appears normal, Mood is euthymic. Affect is appropriate for this mood state and bright. Thought content is free of suicidal ideation, hallucinations, and delusions. Dress is adequate and upkept. Eye contact is good during conversation.       Diagnostic Test Results:  Labs reviewed in Epic  Results for orders placed or performed in visit on 12/11/19 (from the past 24 hour(s))   CBC with platelets and differential   Result Value Ref Range    WBC 5.5 4.0 - 11.0 10e9/L    RBC Count 5.17 4.4 - 5.9 10e12/L    Hemoglobin 15.6 13.3 - 17.7 g/dL    Hematocrit 46.0 40.0 - 53.0 %    MCV 89 78 - 100 fl    MCH 30.2 26.5 - 33.0 pg    MCHC 33.9 31.5 - 36.5 g/dL    RDW 13.2 10.0 - 15.0 %    Platelet Count 130 (L) 150 - 450 10e9/L    % Neutrophils 63.0 %    % Lymphocytes 24.5 %    % Monocytes 8.8 %    % Eosinophils 3.3 %    % Basophils 0.4 %    Absolute Neutrophil 3.4 1.6 - 8.3 10e9/L    Absolute Lymphocytes 1.3 0.8 - 5.3 10e9/L    Absolute Monocytes 0.5 0.0 - 1.3 10e9/L    Absolute Eosinophils 0.2 0.0 - 0.7 10e9/L    Absolute Basophils 0.0 0.0 - 0.2 10e9/L    Diff Method Automated Method    Comprehensive metabolic panel   Result Value Ref Range    Sodium 138 133 - 144 mmol/L    Potassium 4.1 3.4 - 5.3 mmol/L    Chloride 104 94 - 109 mmol/L    Carbon Dioxide 30 20 - 32 mmol/L    Anion Gap 4 3 - 14 mmol/L    Glucose 105 (H) 70 - 99 mg/dL    Urea Nitrogen 14 7 - 30 mg/dL    Creatinine 0.91 0.66 - 1.25 mg/dL    GFR Estimate 87 >60 mL/min/[1.73_m2]    GFR Estimate If Black >90 >60 mL/min/[1.73_m2]    Calcium 9.1 8.5 - 10.1 mg/dL    Bilirubin Total 1.0 0.2 - 1.3 mg/dL    Albumin 4.1 3.4 - 5.0 g/dL    Protein Total 7.4 6.8 - 8.8 g/dL    Alkaline Phosphatase 42 40 - 150 U/L    ALT 24 0 - 70 U/L    AST 16 0 - 45 U/L   Vitamin B12   Result Value Ref Range    Vitamin B12  317 193 - 986 pg/mL   TSH with free T4 reflex   Result Value Ref Range    TSH 2.53 0.40 - 4.00 mU/L   Folate   Result Value Ref Range    Folate 14.6 >5.4 ng/mL     See orders pending in Epic         Assessment & Plan       ICD-10-CM    1. Encounter for hepatitis C screening test for low risk patient Z11.59 Hepatitis C Screen Reflex to HCV RNA Quant and Genotype   2. Weight loss R63.4 CBC with platelets and differential     Comprehensive metabolic panel     TSH with free T4 reflex     CT Abdomen Pelvis w Contrast   3. Other fatigue R53.83 CBC with platelets and differential     Comprehensive metabolic panel     Vitamin B12     Folate     TSH with free T4 reflex   4. Thrombocytopenia (H) D69.6 Oncology/Hematology Adult Referral     - 69 y.o. otherwise healthy male on no medications with worsening fatigue and weight loss  - Weight loss of about ~24 lbs per chart review since his physical in March (~9 months), patient reports this weight in March of 167 was down from his normal 171 as well   - Exam today normal with no lymphadenopathy or masses found    - Possible etiology - thyroid vs. Cancer vs. Vitamin deficiency vs. GI issue vs. Other   - Recommend labs     CBC showed low platelets, chart reviewed that this has been chronic for patient since 2015 but was normal before that and never worked up      Recommend hematology evaluation      Rest of labs pending   - Colonoscopy 2017 - 1 polyp tubular adenoma - repeat in 3 to 5 years,  updated to reflect this   - Since this is quite a dramatic decrease in weight and patient also reports a very slight change to bowel habits, will start with CT scan abd/pelvis  - If all normal, will likely need EGD/colonoscopy as well   - Could also consider getting AA screening as this was not done since his physical and CT lung cancer screen (will need to update his smoking history, right now states 12 year history)   - Discussed warning signs that would warrant return to clinic/ED         The patient indicates understanding of these issues and agrees with the plan.    Follow up: pending labs and imaging     Miriam Eng PA-C  [facility]

## 2019-12-10 NOTE — PROGRESS NOTES
"Subjective     Duane Demann is a 69 year old male who presents to clinic today for the following health issues:    HPI   Concern - weight loss and fatigue  Onset: months ago    Description:   Noticed skin sagging in mirror and belt loosening hasn't changed things to loose this much weight     Intensity: mild-severe    Progression of Symptoms:  worsening    Accompanying Signs & Symptoms:  none    Previous history of similar problem:   none    Precipitating factors:   Worsened by: none    Alleviating factors:  Improved by: none  Therapies Tried and outcome: none    - Physical in March   - No meds   - Eating the same, less bread   - No change to bowel habits   - Pressure in epigastric area   - Feels under the weather all the time   - Tired a little bit all the time   - Gets chills a lot   - Sometimes night sweats       Review of Systems   ROS COMP: Constitutional, HEENT, cardiovascular, pulmonary, gi and gu systems are negative, except as otherwise noted.      Objective    /88   Pulse 90   Temp 97.8  F (36.6  C) (Temporal)   Ht 1.715 m (5' 7.52\")   Wt 65 kg (143 lb 3.2 oz)   SpO2 99%   BMI 22.08 kg/m    Body mass index is 22.08 kg/m .  Physical Exam   GENERAL APPEARANCE: healthy, alert and no distress  EYES: Eyes grossly normal to inspection, PERRLA, conjunctivae and sclerae without injection or discharge, EOM intact   RESP: Lungs clear to auscultation - no rales, rhonchi or wheezes    CV: Regular rates and rhythm, normal S1 S2, no S3 or S4, no murmur, click or rub, no peripheral edema and peripheral pulses strong and symmetric bilaterally   LYMPH: No bilateral cervical, supraclavicular , axillary, or inguinal lymphadenopathy   ABD: Normal bowel sounds, not tender, no masses or hepatosplenomegaly   MS: No musculoskeletal defects are noted and gait is age appropriate without ataxia   SKIN: No suspicious lesions or rashes, hydration status appears adeuqate with normal skin turgor   PSYCH: Alert and oriented " x3; speech- coherent , normal rate and volume; able to articulate logical thoughts, able to abstract reason, no tangential thoughts, no hallucinations or delusions, mentation appears normal, Mood is euthymic. Affect is appropriate for this mood state and bright. Thought content is free of suicidal ideation, hallucinations, and delusions. Dress is adequate and upkept. Eye contact is good during conversation.       Diagnostic Test Results:  Labs reviewed in Epic  Results for orders placed or performed in visit on 12/11/19 (from the past 24 hour(s))   CBC with platelets and differential   Result Value Ref Range    WBC 5.5 4.0 - 11.0 10e9/L    RBC Count 5.17 4.4 - 5.9 10e12/L    Hemoglobin 15.6 13.3 - 17.7 g/dL    Hematocrit 46.0 40.0 - 53.0 %    MCV 89 78 - 100 fl    MCH 30.2 26.5 - 33.0 pg    MCHC 33.9 31.5 - 36.5 g/dL    RDW 13.2 10.0 - 15.0 %    Platelet Count 130 (L) 150 - 450 10e9/L    % Neutrophils 63.0 %    % Lymphocytes 24.5 %    % Monocytes 8.8 %    % Eosinophils 3.3 %    % Basophils 0.4 %    Absolute Neutrophil 3.4 1.6 - 8.3 10e9/L    Absolute Lymphocytes 1.3 0.8 - 5.3 10e9/L    Absolute Monocytes 0.5 0.0 - 1.3 10e9/L    Absolute Eosinophils 0.2 0.0 - 0.7 10e9/L    Absolute Basophils 0.0 0.0 - 0.2 10e9/L    Diff Method Automated Method    Comprehensive metabolic panel   Result Value Ref Range    Sodium 138 133 - 144 mmol/L    Potassium 4.1 3.4 - 5.3 mmol/L    Chloride 104 94 - 109 mmol/L    Carbon Dioxide 30 20 - 32 mmol/L    Anion Gap 4 3 - 14 mmol/L    Glucose 105 (H) 70 - 99 mg/dL    Urea Nitrogen 14 7 - 30 mg/dL    Creatinine 0.91 0.66 - 1.25 mg/dL    GFR Estimate 87 >60 mL/min/[1.73_m2]    GFR Estimate If Black >90 >60 mL/min/[1.73_m2]    Calcium 9.1 8.5 - 10.1 mg/dL    Bilirubin Total 1.0 0.2 - 1.3 mg/dL    Albumin 4.1 3.4 - 5.0 g/dL    Protein Total 7.4 6.8 - 8.8 g/dL    Alkaline Phosphatase 42 40 - 150 U/L    ALT 24 0 - 70 U/L    AST 16 0 - 45 U/L   Vitamin B12   Result Value Ref Range    Vitamin B12  317 193 - 986 pg/mL   TSH with free T4 reflex   Result Value Ref Range    TSH 2.53 0.40 - 4.00 mU/L   Folate   Result Value Ref Range    Folate 14.6 >5.4 ng/mL     See orders pending in Epic         Assessment & Plan       ICD-10-CM    1. Encounter for hepatitis C screening test for low risk patient Z11.59 Hepatitis C Screen Reflex to HCV RNA Quant and Genotype   2. Weight loss R63.4 CBC with platelets and differential     Comprehensive metabolic panel     TSH with free T4 reflex     CT Abdomen Pelvis w Contrast   3. Other fatigue R53.83 CBC with platelets and differential     Comprehensive metabolic panel     Vitamin B12     Folate     TSH with free T4 reflex   4. Thrombocytopenia (H) D69.6 Oncology/Hematology Adult Referral     - 69 y.o. otherwise healthy male on no medications with worsening fatigue and weight loss  - Weight loss of about ~24 lbs per chart review since his physical in March (~9 months), patient reports this weight in March of 167 was down from his normal 171 as well   - Exam today normal with no lymphadenopathy or masses found    - Possible etiology - thyroid vs. Cancer vs. Vitamin deficiency vs. GI issue vs. Other   - Recommend labs     CBC showed low platelets, chart reviewed that this has been chronic for patient since 2015 but was normal before that and never worked up      Recommend hematology evaluation      Rest of labs pending   - Colonoscopy 2017 - 1 polyp tubular adenoma - repeat in 3 to 5 years,  updated to reflect this   - Since this is quite a dramatic decrease in weight and patient also reports a very slight change to bowel habits, will start with CT scan abd/pelvis  - If all normal, will likely need EGD/colonoscopy as well   - Could also consider getting AA screening as this was not done since his physical and CT lung cancer screen (will need to update his smoking history, right now states 12 year history)   - Discussed warning signs that would warrant return to clinic/ED         The patient indicates understanding of these issues and agrees with the plan.    Follow up: pending labs and imaging     Miriam Eng PA-C  [facility]

## 2019-12-11 ENCOUNTER — OFFICE VISIT (OUTPATIENT)
Dept: FAMILY MEDICINE | Facility: OTHER | Age: 69
End: 2019-12-11
Payer: MEDICARE

## 2019-12-11 VITALS
WEIGHT: 143.2 LBS | HEIGHT: 68 IN | SYSTOLIC BLOOD PRESSURE: 134 MMHG | BODY MASS INDEX: 21.7 KG/M2 | HEART RATE: 90 BPM | DIASTOLIC BLOOD PRESSURE: 88 MMHG | OXYGEN SATURATION: 99 % | TEMPERATURE: 97.8 F

## 2019-12-11 DIAGNOSIS — R63.4 WEIGHT LOSS: ICD-10-CM

## 2019-12-11 DIAGNOSIS — R53.83 OTHER FATIGUE: ICD-10-CM

## 2019-12-11 DIAGNOSIS — D69.6 THROMBOCYTOPENIA (H): ICD-10-CM

## 2019-12-11 DIAGNOSIS — Z11.59 ENCOUNTER FOR HEPATITIS C SCREENING TEST FOR LOW RISK PATIENT: Primary | ICD-10-CM

## 2019-12-11 LAB
ALBUMIN SERPL-MCNC: 4.1 G/DL (ref 3.4–5)
ALP SERPL-CCNC: 42 U/L (ref 40–150)
ALT SERPL W P-5'-P-CCNC: 24 U/L (ref 0–70)
ANION GAP SERPL CALCULATED.3IONS-SCNC: 4 MMOL/L (ref 3–14)
AST SERPL W P-5'-P-CCNC: 16 U/L (ref 0–45)
BASOPHILS # BLD AUTO: 0 10E9/L (ref 0–0.2)
BASOPHILS NFR BLD AUTO: 0.4 %
BILIRUB SERPL-MCNC: 1 MG/DL (ref 0.2–1.3)
BUN SERPL-MCNC: 14 MG/DL (ref 7–30)
CALCIUM SERPL-MCNC: 9.1 MG/DL (ref 8.5–10.1)
CHLORIDE SERPL-SCNC: 104 MMOL/L (ref 94–109)
CO2 SERPL-SCNC: 30 MMOL/L (ref 20–32)
CREAT SERPL-MCNC: 0.91 MG/DL (ref 0.66–1.25)
DIFFERENTIAL METHOD BLD: ABNORMAL
EOSINOPHIL # BLD AUTO: 0.2 10E9/L (ref 0–0.7)
EOSINOPHIL NFR BLD AUTO: 3.3 %
ERYTHROCYTE [DISTWIDTH] IN BLOOD BY AUTOMATED COUNT: 13.2 % (ref 10–15)
FOLATE SERPL-MCNC: 14.6 NG/ML
GFR SERPL CREATININE-BSD FRML MDRD: 87 ML/MIN/{1.73_M2}
GLUCOSE SERPL-MCNC: 105 MG/DL (ref 70–99)
HCT VFR BLD AUTO: 46 % (ref 40–53)
HGB BLD-MCNC: 15.6 G/DL (ref 13.3–17.7)
LYMPHOCYTES # BLD AUTO: 1.3 10E9/L (ref 0.8–5.3)
LYMPHOCYTES NFR BLD AUTO: 24.5 %
MCH RBC QN AUTO: 30.2 PG (ref 26.5–33)
MCHC RBC AUTO-ENTMCNC: 33.9 G/DL (ref 31.5–36.5)
MCV RBC AUTO: 89 FL (ref 78–100)
MONOCYTES # BLD AUTO: 0.5 10E9/L (ref 0–1.3)
MONOCYTES NFR BLD AUTO: 8.8 %
NEUTROPHILS # BLD AUTO: 3.4 10E9/L (ref 1.6–8.3)
NEUTROPHILS NFR BLD AUTO: 63 %
PLATELET # BLD AUTO: 130 10E9/L (ref 150–450)
POTASSIUM SERPL-SCNC: 4.1 MMOL/L (ref 3.4–5.3)
PROT SERPL-MCNC: 7.4 G/DL (ref 6.8–8.8)
RBC # BLD AUTO: 5.17 10E12/L (ref 4.4–5.9)
SODIUM SERPL-SCNC: 138 MMOL/L (ref 133–144)
TSH SERPL DL<=0.005 MIU/L-ACNC: 2.53 MU/L (ref 0.4–4)
VIT B12 SERPL-MCNC: 317 PG/ML (ref 193–986)
WBC # BLD AUTO: 5.5 10E9/L (ref 4–11)

## 2019-12-11 PROCEDURE — 82607 VITAMIN B-12: CPT | Performed by: PHYSICIAN ASSISTANT

## 2019-12-11 PROCEDURE — 84443 ASSAY THYROID STIM HORMONE: CPT | Performed by: PHYSICIAN ASSISTANT

## 2019-12-11 PROCEDURE — 87522 HEPATITIS C REVRS TRNSCRPJ: CPT | Performed by: PHYSICIAN ASSISTANT

## 2019-12-11 PROCEDURE — 82746 ASSAY OF FOLIC ACID SERUM: CPT | Performed by: PHYSICIAN ASSISTANT

## 2019-12-11 PROCEDURE — 80050 GENERAL HEALTH PANEL: CPT | Performed by: PHYSICIAN ASSISTANT

## 2019-12-11 PROCEDURE — G0472 HEP C SCREEN HIGH RISK/OTHER: HCPCS | Performed by: PHYSICIAN ASSISTANT

## 2019-12-11 PROCEDURE — 99214 OFFICE O/P EST MOD 30 MIN: CPT | Performed by: PHYSICIAN ASSISTANT

## 2019-12-11 PROCEDURE — 80053 COMPREHEN METABOLIC PANEL: CPT | Performed by: PHYSICIAN ASSISTANT

## 2019-12-11 PROCEDURE — 36415 COLL VENOUS BLD VENIPUNCTURE: CPT | Performed by: PHYSICIAN ASSISTANT

## 2019-12-11 ASSESSMENT — MIFFLIN-ST. JEOR: SCORE: 1391.43

## 2019-12-11 ASSESSMENT — PAIN SCALES - GENERAL: PAINLEVEL: NO PAIN (0)

## 2019-12-11 NOTE — PATIENT INSTRUCTIONS
1. Await rest of labs     2. They will call you to schedule with hematology regarding low platelets     3. CT scan

## 2019-12-12 ENCOUNTER — TELEPHONE (OUTPATIENT)
Dept: FAMILY MEDICINE | Facility: OTHER | Age: 69
End: 2019-12-12

## 2019-12-13 ENCOUNTER — ANCILLARY PROCEDURE (OUTPATIENT)
Dept: CT IMAGING | Facility: CLINIC | Age: 69
End: 2019-12-13
Attending: PHYSICIAN ASSISTANT
Payer: MEDICARE

## 2019-12-13 ENCOUNTER — TELEPHONE (OUTPATIENT)
Dept: FAMILY MEDICINE | Facility: OTHER | Age: 69
End: 2019-12-13

## 2019-12-13 DIAGNOSIS — Z12.5 SCREENING FOR PROSTATE CANCER: Primary | ICD-10-CM

## 2019-12-13 DIAGNOSIS — R91.8 PULMONARY NODULES: ICD-10-CM

## 2019-12-13 DIAGNOSIS — R63.4 WEIGHT LOSS: ICD-10-CM

## 2019-12-13 DIAGNOSIS — R19.4 BOWEL HABIT CHANGES: ICD-10-CM

## 2019-12-13 DIAGNOSIS — R63.4 UNINTENDED WEIGHT LOSS: ICD-10-CM

## 2019-12-13 PROCEDURE — 74177 CT ABD & PELVIS W/CONTRAST: CPT | Performed by: RADIOLOGY

## 2019-12-13 RX ORDER — IOPAMIDOL 755 MG/ML
88 INJECTION, SOLUTION INTRAVASCULAR ONCE
Status: COMPLETED | OUTPATIENT
Start: 2019-12-13 | End: 2019-12-13

## 2019-12-13 RX ADMIN — IOPAMIDOL 88 ML: 755 INJECTION, SOLUTION INTRAVASCULAR at 10:15

## 2019-12-13 NOTE — TELEPHONE ENCOUNTER
Please call patient     CT scan was mainly normal. There was a haziness in the prostate that could be something or nothing. They recommend we get PSA (prostate blood level) so he will need to come in for a lab. There was also a couple small lung nodules. This is likely from past smoking but isn't the cause of his weight loss. I recommend a CT scan of his lungs be completed as this imaging was abdomen and pelvis and didn't show his complete lung fields. Please also get his smoking history - age started, when quit, and how much smoked during that time.     Labs were normal except positive screening test for Hepatitis C. This is a infection that can cause liver cancer. However it is just a screening, so we are running more tests to see if this is true positive, false positive, or showing he cleared an infection in the past. I will let him know when I hear back about this.    I recommend we also proceed with EGD and colonoscopy. I have ordered these.    Josue Eng PA-C  [facility]

## 2019-12-16 ENCOUNTER — TELEPHONE (OUTPATIENT)
Dept: FAMILY MEDICINE | Facility: OTHER | Age: 69
End: 2019-12-16

## 2019-12-16 ENCOUNTER — HOSPITAL ENCOUNTER (OUTPATIENT)
Facility: AMBULATORY SURGERY CENTER | Age: 69
End: 2019-12-16
Attending: INTERNAL MEDICINE
Payer: MEDICARE

## 2019-12-16 LAB
HCV AB SERPL QL IA: REACTIVE
HCV RNA SERPL NAA+PROBE-ACNC: NORMAL [IU]/ML
HCV RNA SERPL NAA+PROBE-LOG IU: NORMAL LOG IU/ML

## 2019-12-16 NOTE — TELEPHONE ENCOUNTER
Pt called and I gave him the message below. He said he started smoking at 18 and quit at the age of 27 and he smoked from either a pack a day up to two packs a day. No questions at this time. Thank you

## 2019-12-16 NOTE — TELEPHONE ENCOUNTER
See other encounter. There are 3 open telephone calls.     I can't call in anything for anxiety since we didn't discuss. Would need appointment.     Needs to schedule his EGD/Colonoscopy and to come in for prostate lab.     All the rest of his labs were normal. Hep C screening test came back positive but this test can have false positive. So we ran the actual Hep C RNA test and this was negative.    Josue Eng PA-C  [facility]

## 2019-12-16 NOTE — TELEPHONE ENCOUNTER
Called to schedule scope, patient states  He is doing other testing at this time and unsure if he wants to do scope right now. He was going to call his pcp and get back to us.

## 2019-12-16 NOTE — TELEPHONE ENCOUNTER
Daughter, Liana Ko. Called about lab results for dad, they are confused about them, please call to discuss.     Bad anxiety right now and wondering if there can be a script called in.     Call patients number to get verbal from patient to speak with daughter(s).     [phone]

## 2019-12-16 NOTE — PROGRESS NOTES
"Subjective     Duane Demann is a 69 year old male who presents to clinic today with his wife for the following health issues:    HPI     Abnormal Mood Symptoms  Onset: couple weeks     Description:   Depression: no  Anxiety: YES    Accompanying Signs & Symptoms:  Still participating in activities that you used to enjoy: no  Fatigue: YES  Irritability: no: very negative and hopeless   Difficulty concentrating: YES  Changes in appetite: YES: beginning to loose   Problems with sleep: YES- cold sweats   Heart racing/beating fast : YES- just this morning   Thoughts of hurting yourself or others: none    History:   Recent stress: YES- daughter and health concerns   Prior depression hospitalization: None  Family history of depression: no  Family history of anxiety: no    Precipitating factors:   Alcohol/drug use: no    Alleviating factors:  Getting around people- he starts to feel good   Therapies Tried and outcome: None    - Worrying about daughter over summer   - Sweating at night   - Wakes frequently, takes a while to get back to sleep     DEX-7 SCORE 12/17/2019   Total Score 15 (severe anxiety)   Total Score 15       PHQ-9 SCORE 12/17/2019   PHQ-9 Total Score MyChart 11 (Moderate depression)   PHQ-9 Total Score 11       Review of Systems   ROS COMP: Constitutional, HEENT, cardiovascular, pulmonary, gi and gu systems are negative, except as otherwise noted.      Objective    BP (!) 140/90   Pulse 80   Temp 97.6  F (36.4  C) (Temporal)   Resp 14   Ht 1.715 m (5' 7.52\")   Wt 64.7 kg (142 lb 9.6 oz)   SpO2 99%   BMI 21.99 kg/m    Body mass index is 21.99 kg/m .  Physical Exam   GENERAL APPEARANCE: healthy, alert and no distress  EYES: Eyes grossly normal to inspection, PERRLA, conjunctivae and sclerae without injection or discharge, EOM intact   MS: No musculoskeletal defects are noted and gait is age appropriate without ataxia   SKIN: No suspicious lesions or rashes, hydration status appears adeuqate with normal " skin turgor   PSYCH: Alert and oriented x3; speech- coherent , normal rate and volume; able to articulate logical thoughts, able to abstract reason, no tangential thoughts, no hallucinations or delusions, mentation appears normal, Mood is euthymic. Affect is appropriate for this mood state and bright but anxious at times. Thought content is free of suicidal ideation, hallucinations, and delusions. Dress is adequate and upkept. Eye contact is good during conversation.       Diagnostic Test Results:  Labs reviewed in Epic  none         Assessment & Plan       ICD-10-CM    1. Situational anxiety F41.8 mirtazapine (REMERON) 7.5 MG tablet   2. Unintended weight loss R63.4      - Patient here because anxiety out of control since visit last week due to unintended weight loss of 24+ lbs since physical in March, states even since then that weight was down as he is usually 175 lbs  Wt Readings from Last 4 Encounters:   12/17/19 64.7 kg (142 lb 9.6 oz)   12/11/19 65 kg (143 lb 3.2 oz)   03/22/19 75.8 kg (167 lb 3.2 oz)   03/13/17 76.2 kg (168 lb)     - CT scan reviewed      Possible prostate lesion, recommended labs with PSA, patient reports no urinary symptoms, await results       Small liver lesion, likely cyst, discussed results and my conversation with radiologist, will get hepatic panel, likely nothing, recommend MRI of abdomen in 3 months to see if growing        Benign lung nodules, recheck in 1 year with CT scan   - Reviewed all labs, all normal, false positive of Hep C screening but RNA was negative   - Discussed still need labs as above and colonoscopy/EGD     Colonoscopy/EGD because reported change to bowel habits with more frequent constipation but today patient states feels bowels are normal      If all this is normal, then weight loss likely from stress/worry/anxiety, less physical activity (recently retired, 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, now sits around all day), and cutting carbs out of diet (wife did this)     -  Anxiety      Not discussed at previous visit, but states been worrying about everything since a young age but worsened this summer with issues with daughter and then even worse since last visit      Discussed how can contribute to weight loss, does report not eating much lately      Discussed SSRI therapy at length including use and short and long term side effects      Patient not having panic attacks, some poor sleep, does have night sweats (CBC was normal)      Will start Remeron as may aid in weight gain      Discussed monitoring and recheck     - Plan      - Start Remeron - 1 tablet at bedtime      - Recheck 3-4 weeks      - Get labs and colonoscopy/egd - await results        The patient and his wife indicates understanding of these issues and agrees with the plan.    Return in about 1 month (around 1/17/2020).    Miriam Eng PA-C  [facility]

## 2019-12-16 NOTE — TELEPHONE ENCOUNTER
Should schedule colonoscopy/EGD. His Hepatitis C did come back and was negative (the screening was a false positive).     I do need him to come in for a lab draw to check PSA level as well.     I can not get him anything without an appointment as we did not discuss anxiety.     Josue Eng PA-C  [facility]

## 2019-12-16 NOTE — TELEPHONE ENCOUNTER
Spoke to patient and relayed message. He is scheduled tomorrow morning with CDL to discuss his anxiety. He was transferred to [place] to schedule his colonoscopy  Pamela Hyde CMA

## 2019-12-16 NOTE — RESULT ENCOUNTER NOTE
Hello Duane    Your Hep C results came back negative. The screening was a false positive.     The results are attached for your review.       Josue Eng PA-C

## 2019-12-16 NOTE — TELEPHONE ENCOUNTER
Schedulers called him to help schedule colonoscopy but patient stated that CDL were waiting on some more lab results to see if he should proceed with that... He has very high anxiety so wanted to clarify with CDL- and if he can get something for his anxiety uses [facility] in [place]- pharmacy pended if appropriate.

## 2019-12-16 NOTE — TELEPHONE ENCOUNTER
Routing to last provider that seen patient and ordered labs. Tiffanie Gupta has not seen patient since March 2019  Thanks  Sandy Gaytan RT (R)

## 2019-12-17 ENCOUNTER — OFFICE VISIT (OUTPATIENT)
Dept: FAMILY MEDICINE | Facility: OTHER | Age: 69
End: 2019-12-17
Payer: MEDICARE

## 2019-12-17 VITALS
HEART RATE: 80 BPM | OXYGEN SATURATION: 99 % | DIASTOLIC BLOOD PRESSURE: 90 MMHG | WEIGHT: 142.6 LBS | SYSTOLIC BLOOD PRESSURE: 140 MMHG | BODY MASS INDEX: 21.61 KG/M2 | HEIGHT: 68 IN | RESPIRATION RATE: 14 BRPM | TEMPERATURE: 97.6 F

## 2019-12-17 DIAGNOSIS — R63.4 UNINTENDED WEIGHT LOSS: ICD-10-CM

## 2019-12-17 DIAGNOSIS — Z12.5 SCREENING FOR PROSTATE CANCER: ICD-10-CM

## 2019-12-17 DIAGNOSIS — F41.8 SITUATIONAL ANXIETY: Primary | ICD-10-CM

## 2019-12-17 LAB — PSA SERPL-ACNC: 2.93 UG/L (ref 0–4)

## 2019-12-17 PROCEDURE — G0103 PSA SCREENING: HCPCS | Performed by: PHYSICIAN ASSISTANT

## 2019-12-17 PROCEDURE — 99214 OFFICE O/P EST MOD 30 MIN: CPT | Performed by: PHYSICIAN ASSISTANT

## 2019-12-17 PROCEDURE — 36415 COLL VENOUS BLD VENIPUNCTURE: CPT | Performed by: PHYSICIAN ASSISTANT

## 2019-12-17 RX ORDER — MIRTAZAPINE 7.5 MG/1
7.5 TABLET, FILM COATED ORAL AT BEDTIME
Qty: 30 TABLET | Refills: 1 | Status: SHIPPED | OUTPATIENT
Start: 2019-12-17 | End: 2020-01-15

## 2019-12-17 RX ORDER — ALPRAZOLAM 0.25 MG
0.25 TABLET ORAL 3 TIMES DAILY PRN
Status: CANCELLED | OUTPATIENT
Start: 2019-12-17

## 2019-12-17 RX ORDER — HYDROXYZINE HYDROCHLORIDE 25 MG/1
25 TABLET, FILM COATED ORAL 3 TIMES DAILY PRN
Status: CANCELLED | OUTPATIENT
Start: 2019-12-17

## 2019-12-17 RX ORDER — BUSPIRONE HYDROCHLORIDE 5 MG/1
5 TABLET ORAL 3 TIMES DAILY
Status: CANCELLED | OUTPATIENT
Start: 2019-12-17

## 2019-12-17 ASSESSMENT — MIFFLIN-ST. JEOR: SCORE: 1388.71

## 2019-12-17 ASSESSMENT — PATIENT HEALTH QUESTIONNAIRE - PHQ9
SUM OF ALL RESPONSES TO PHQ QUESTIONS 1-9: 11
SUM OF ALL RESPONSES TO PHQ QUESTIONS 1-9: 11
10. IF YOU CHECKED OFF ANY PROBLEMS, HOW DIFFICULT HAVE THESE PROBLEMS MADE IT FOR YOU TO DO YOUR WORK, TAKE CARE OF THINGS AT HOME, OR GET ALONG WITH OTHER PEOPLE: SOMEWHAT DIFFICULT

## 2019-12-17 ASSESSMENT — PAIN SCALES - GENERAL: PAINLEVEL: NO PAIN (0)

## 2019-12-17 ASSESSMENT — ANXIETY QUESTIONNAIRES
7. FEELING AFRAID AS IF SOMETHING AWFUL MIGHT HAPPEN: MORE THAN HALF THE DAYS
3. WORRYING TOO MUCH ABOUT DIFFERENT THINGS: NEARLY EVERY DAY
5. BEING SO RESTLESS THAT IT IS HARD TO SIT STILL: SEVERAL DAYS
6. BECOMING EASILY ANNOYED OR IRRITABLE: SEVERAL DAYS
GAD7 TOTAL SCORE: 15
2. NOT BEING ABLE TO STOP OR CONTROL WORRYING: NEARLY EVERY DAY
GAD7 TOTAL SCORE: 15
4. TROUBLE RELAXING: MORE THAN HALF THE DAYS
7. FEELING AFRAID AS IF SOMETHING AWFUL MIGHT HAPPEN: MORE THAN HALF THE DAYS
GAD7 TOTAL SCORE: 15
1. FEELING NERVOUS, ANXIOUS, OR ON EDGE: NEARLY EVERY DAY

## 2019-12-17 NOTE — PATIENT INSTRUCTIONS
- Start Remeron - 1 tablet at bedtime     - Recheck 3-4 weeks     - Labs, colonoscopy/egd - await results

## 2019-12-17 NOTE — TELEPHONE ENCOUNTER
Date of colonoscopy/EGD: 12/24/19  Surgeon: Dr. Blandon  Prep:Miralax  Packet:Colonoscopy/EGD instructions was given to patient in clinic.   Date: 12/17/2019      Surgery Scheduler

## 2019-12-18 ASSESSMENT — ANXIETY QUESTIONNAIRES: GAD7 TOTAL SCORE: 15

## 2019-12-18 ASSESSMENT — PATIENT HEALTH QUESTIONNAIRE - PHQ9: SUM OF ALL RESPONSES TO PHQ QUESTIONS 1-9: 11

## 2019-12-18 NOTE — RESULT ENCOUNTER NOTE
Hello Duane    Your prostate lab results were normal.     The results are attached for your review.       Josue Eng PA-C

## 2019-12-19 ENCOUNTER — HOSPITAL ENCOUNTER (OUTPATIENT)
Dept: CT IMAGING | Facility: CLINIC | Age: 69
Discharge: HOME | End: 2019-12-19
Attending: PHYSICIAN ASSISTANT | Admitting: PHYSICIAN ASSISTANT
Payer: MEDICARE

## 2019-12-19 DIAGNOSIS — R63.4 UNINTENDED WEIGHT LOSS: ICD-10-CM

## 2019-12-19 DIAGNOSIS — R91.8 PULMONARY NODULES: ICD-10-CM

## 2019-12-19 PROCEDURE — 25000125 ZZHC RX 250: Performed by: PHYSICIAN ASSISTANT

## 2019-12-19 PROCEDURE — 25000128 H RX IP 250 OP 636: Performed by: PHYSICIAN ASSISTANT

## 2019-12-19 PROCEDURE — 71260 CT THORAX DX C+: CPT

## 2019-12-19 RX ORDER — IOPAMIDOL 755 MG/ML
100 INJECTION, SOLUTION INTRAVASCULAR ONCE
Status: COMPLETED | OUTPATIENT
Start: 2019-12-19 | End: 2019-12-19

## 2019-12-19 RX ADMIN — IOPAMIDOL 75 ML: 755 INJECTION, SOLUTION INTRAVENOUS at 12:23

## 2019-12-19 RX ADMIN — SODIUM CHLORIDE 75 ML: 9 INJECTION, SOLUTION INTRAVENOUS at 12:22

## 2019-12-19 NOTE — RESULT ENCOUNTER NOTE
Hello Duane    Your results were normal. Just a few very small nodules. These are likely benign. We will recheck this CT scan in 1 year to make sure they don't change/grow.     The results are attached for your review.       Josue Eng PA-C

## 2019-12-23 ENCOUNTER — ANESTHESIA EVENT (OUTPATIENT)
Dept: GASTROENTEROLOGY | Facility: CLINIC | Age: 69
End: 2019-12-23
Payer: MEDICARE

## 2019-12-23 ASSESSMENT — LIFESTYLE VARIABLES: TOBACCO_USE: 1

## 2019-12-23 NOTE — ANESTHESIA PREPROCEDURE EVALUATION
Anesthesia Evaluation     . Pt has had prior anesthetic.            ROS/MED HX    ENT/Pulmonary:     (+)sleep apnea, tobacco use, Past use , . .    Neurologic:  - neg neurologic ROS     Cardiovascular:     (+) Dyslipidemia, hypertension----. : . . . :. . Previous cardiac testing date:results:date: results:ECG reviewed date:8/21/15 results:SB date: results:          METS/Exercise Tolerance:     Hematologic:  - neg hematologic  ROS       Musculoskeletal:  - neg musculoskeletal ROS       GI/Hepatic:     (+) bowel prep,       Renal/Genitourinary:  - ROS Renal section negative       Endo:  - neg endo ROS       Psychiatric:  - neg psychiatric ROS       Infectious Disease:  - neg infectious disease ROS       Malignancy:      - no malignancy   Other:    - neg other ROS                 Physical Exam  Normal systems: cardiovascular, pulmonary and dental    Airway   Mallampati: II  TM distance: >3 FB  Neck ROM: full    Dental     Cardiovascular   Rhythm and rate: regular and normal      Pulmonary                         Anesthesia Plan      History & Physical Review  History and physical reviewed and following examination; no interval change.H and P to be performed by Dr. Blandon prior to anesthetic.      ASA Status:  2 .    NPO Status:  > 6 hours    Plan for MAC with Intravenous and Propofol induction. Maintenance will be TIVA.  Reason for MAC:  Deep or markedly invasive procedure (G8)         Postoperative Care      Consents  Anesthetic plan, risks, benefits and alternatives discussed with:  Patient or representative.  Use of blood products discussed: No .   .                          .

## 2019-12-24 ENCOUNTER — ANESTHESIA (OUTPATIENT)
Dept: GASTROENTEROLOGY | Facility: CLINIC | Age: 69
End: 2019-12-24
Payer: MEDICARE

## 2019-12-24 ENCOUNTER — HOSPITAL ENCOUNTER (OUTPATIENT)
Facility: CLINIC | Age: 69
Discharge: HOME | End: 2019-12-24
Attending: SURGERY | Admitting: SURGERY
Payer: MEDICARE

## 2019-12-24 VITALS
DIASTOLIC BLOOD PRESSURE: 97 MMHG | RESPIRATION RATE: 16 BRPM | OXYGEN SATURATION: 100 % | HEART RATE: 62 BPM | SYSTOLIC BLOOD PRESSURE: 137 MMHG | TEMPERATURE: 97.9 F

## 2019-12-24 LAB
COLONOSCOPY: NORMAL
UPPER GI ENDOSCOPY: NORMAL

## 2019-12-24 PROCEDURE — 25000128 H RX IP 250 OP 636: Performed by: NURSE ANESTHETIST, CERTIFIED REGISTERED

## 2019-12-24 PROCEDURE — 45385 COLONOSCOPY W/LESION REMOVAL: CPT | Performed by: SURGERY

## 2019-12-24 PROCEDURE — 88305 TISSUE EXAM BY PATHOLOGIST: CPT | Performed by: SURGERY

## 2019-12-24 PROCEDURE — 37000009 ZZH ANESTHESIA TECHNICAL FEE, EACH ADDTL 15 MIN: Performed by: SURGERY

## 2019-12-24 PROCEDURE — 25000125 ZZHC RX 250: Performed by: NURSE ANESTHETIST, CERTIFIED REGISTERED

## 2019-12-24 PROCEDURE — 37000008 ZZH ANESTHESIA TECHNICAL FEE, 1ST 30 MIN: Performed by: SURGERY

## 2019-12-24 PROCEDURE — 43235 EGD DIAGNOSTIC BRUSH WASH: CPT | Mod: 51 | Performed by: SURGERY

## 2019-12-24 PROCEDURE — 25800030 ZZH RX IP 258 OP 636: Performed by: NURSE ANESTHETIST, CERTIFIED REGISTERED

## 2019-12-24 PROCEDURE — 88305 TISSUE EXAM BY PATHOLOGIST: CPT | Mod: 26 | Performed by: SURGERY

## 2019-12-24 PROCEDURE — 43235 EGD DIAGNOSTIC BRUSH WASH: CPT | Performed by: SURGERY

## 2019-12-24 RX ORDER — ONDANSETRON 2 MG/ML
4 INJECTION INTRAMUSCULAR; INTRAVENOUS
Status: DISCONTINUED | OUTPATIENT
Start: 2019-12-24 | End: 2019-12-24 | Stop reason: HOSPADM

## 2019-12-24 RX ORDER — LIDOCAINE 40 MG/G
CREAM TOPICAL
Status: DISCONTINUED | OUTPATIENT
Start: 2019-12-24 | End: 2019-12-24

## 2019-12-24 RX ORDER — PROPOFOL 10 MG/ML
INJECTION, EMULSION INTRAVENOUS PRN
Status: DISCONTINUED | OUTPATIENT
Start: 2019-12-24 | End: 2019-12-24

## 2019-12-24 RX ORDER — PROPOFOL 10 MG/ML
INJECTION, EMULSION INTRAVENOUS CONTINUOUS PRN
Status: DISCONTINUED | OUTPATIENT
Start: 2019-12-24 | End: 2019-12-24

## 2019-12-24 RX ORDER — LIDOCAINE 40 MG/G
CREAM TOPICAL
Status: DISCONTINUED | OUTPATIENT
Start: 2019-12-24 | End: 2019-12-24 | Stop reason: HOSPADM

## 2019-12-24 RX ORDER — SODIUM CHLORIDE, SODIUM LACTATE, POTASSIUM CHLORIDE, CALCIUM CHLORIDE 600; 310; 30; 20 MG/100ML; MG/100ML; MG/100ML; MG/100ML
INJECTION, SOLUTION INTRAVENOUS CONTINUOUS
Status: DISCONTINUED | OUTPATIENT
Start: 2019-12-24 | End: 2019-12-24 | Stop reason: HOSPADM

## 2019-12-24 RX ORDER — LIDOCAINE HYDROCHLORIDE 20 MG/ML
INJECTION, SOLUTION INFILTRATION; PERINEURAL PRN
Status: DISCONTINUED | OUTPATIENT
Start: 2019-12-24 | End: 2019-12-24

## 2019-12-24 RX ADMIN — PROPOFOL 40 MG: 10 INJECTION, EMULSION INTRAVENOUS at 09:15

## 2019-12-24 RX ADMIN — TOPICAL ANESTHETIC 2 EACH: 200 SPRAY DENTAL; PERIODONTAL at 09:09

## 2019-12-24 RX ADMIN — SODIUM CHLORIDE, POTASSIUM CHLORIDE, SODIUM LACTATE AND CALCIUM CHLORIDE: 600; 310; 30; 20 INJECTION, SOLUTION INTRAVENOUS at 09:06

## 2019-12-24 RX ADMIN — LIDOCAINE HYDROCHLORIDE 1 ML: 10 INJECTION, SOLUTION EPIDURAL; INFILTRATION; INTRACAUDAL; PERINEURAL at 08:26

## 2019-12-24 RX ADMIN — PROPOFOL 150 MCG/KG/MIN: 10 INJECTION, EMULSION INTRAVENOUS at 09:15

## 2019-12-24 RX ADMIN — LIDOCAINE HYDROCHLORIDE 40 MG: 20 INJECTION, SOLUTION INFILTRATION; PERINEURAL at 09:15

## 2019-12-24 RX ADMIN — SODIUM CHLORIDE, POTASSIUM CHLORIDE, SODIUM LACTATE AND CALCIUM CHLORIDE: 600; 310; 30; 20 INJECTION, SOLUTION INTRAVENOUS at 08:26

## 2019-12-24 RX ADMIN — PROPOFOL 60 MG: 10 INJECTION, EMULSION INTRAVENOUS at 09:17

## 2019-12-24 RX ADMIN — PROPOFOL 50 MG: 10 INJECTION, EMULSION INTRAVENOUS at 09:20

## 2019-12-24 NOTE — ANESTHESIA CARE TRANSFER NOTE
Patient: Duane Demann    Procedure(s):  COLONOSCOPY, FLEXIBLE, WITH LESION REMOVAL USING SNARE  ESOPHAGOGASTRODUODENOSCOPY (EGD)    Diagnosis: Unintended weight loss [R63.4]  Change in bowel habits [R19.4]  Diagnosis Additional Information: No value filed.    Anesthesia Type:   MAC     Note:  Airway :Face Mask  Patient transferred to:Phase II  Handoff Report: Identifed the Patient, Identified the Reponsible Provider, Reviewed the pertinent medical history, Discussed the surgical course, Reviewed Intra-OP anesthesia mangement and issues during anesthesia, Set expectations for post-procedure period and Allowed opportunity for questions and acknowledgement of understanding      Vitals: (Last set prior to Anesthesia Care Transfer)    CRNA VITALS  12/24/2019 0911 - 12/24/2019 0941      12/24/2019             Pulse:  72    SpO2:  98 %                Electronically Signed By: ZENOBIA Daley CRNA  December 24, 2019  9:41 AM

## 2019-12-24 NOTE — LETTER
Duane Demann  [address] [facility] [id number] December 30, 2019      Dear Duane,  This letter is to inform you of the results of your pathology report from your colonoscopy.  Your pathology report was:  FINAL DIAGNOSIS:   A.  Right colon, mucosal biopsy:   - Tubular adenoma.   - Negative for high-grade dysplasia and malignancy.     B.  Left colon, mucosal biopsy:   - Normal colonic mucosa.  These are benign polyps. These types of polyps do carry a small risk of developing into a cancer over time if not removed. Yours were completely removed at the time of your colonoscopy. You should have another surveillance colonoscopy in 5 years.  If you have further questions please don t hesitate to call our clinic at [phone].   Sincerely,     Moisés Blandon, DO

## 2019-12-24 NOTE — DISCHARGE INSTRUCTIONS
[facility]    Home Care Following Endoscopy          Activity:    You have just undergone an endoscopic procedure usually performed with conscious sedation.  Do not work or operate machinery (including a car) for at least 12 hours.      I encourage you to walk and attempt to pass this air as soon as possible.    Diet:    Return to the diet you were on before your procedure but eat lightly for the first 12-24 hours.    Drink plenty of water.    Resume any regular medications unless otherwise advised by your physician.  Please begin any new medication prescribed as a result of your procedure as directed by your physician.     If you had any biopsy or polyp removed please refrain from aspirin or aspirin products for 2 days.  If on Coumadin please restart as instructed by your physician.   Pain:    You may take Tylenol as needed for pain.  Expected Recovery:    You can expect some mild abdominal fullness and/or discomfort due to the air used to inflate your intestinal tract. It is also normal to have a mild sore throat after upper endoscopy.    Call Your Physician if You Have:    After Upper Endoscopy:  o Shoulder, back or chest pain.  o Difficulty breathing or swallowing.  o Vomiting blood.    After Colonoscopy:  o Worsening persisting abdominal pain which is worse with activity.  o Fevers (>101 degrees F), chills or shakes.  o Passage of continued blood with bowel movements.   Any questions or concerns about your recovery, please call [phone] or after hours [phone] Nurse Advice Line.    Follow-up Care:    You should receive a call or letter with your results within 1 week. Please call if you have not received a notification of your results.

## 2019-12-24 NOTE — ANESTHESIA POSTPROCEDURE EVALUATION
Patient: Duane Demann    Procedure(s):  COLONOSCOPY, FLEXIBLE, WITH LESION REMOVAL USING SNARE  ESOPHAGOGASTRODUODENOSCOPY (EGD)    Diagnosis:Unintended weight loss [R63.4]  Change in bowel habits [R19.4]  Diagnosis Additional Information: No value filed.    Anesthesia Type:  MAC    Note:  Anesthesia Post Evaluation    Patient location during evaluation: Phase 2  Patient participation: Able to fully participate in evaluation  Level of consciousness: awake  Pain management: adequate  Airway patency: patent  Cardiovascular status: acceptable and hemodynamically stable  Respiratory status: acceptable, room air and nonlabored ventilation  Hydration status: stable  PONV: none     Anesthetic complications: None    Comments: Patient was happy with the anesthesia care received and no anesthesia related complications were noted.  I will follow up with the patient again if it is needed.        Last vitals:  Vitals:    12/24/19 0813 12/24/19 0940 12/24/19 0950   BP: 119/87 98/64 (!) 88/73   Pulse: 70  68   Resp: 16     Temp: 97.9  F (36.6  C)     SpO2: 98%  97%         Electronically Signed By: ZENOBIA Daley CRNA  December 24, 2019  9:57 AM

## 2019-12-27 ENCOUNTER — TELEPHONE (OUTPATIENT)
Dept: FAMILY MEDICINE | Facility: OTHER | Age: 69
End: 2019-12-27

## 2019-12-27 DIAGNOSIS — D69.6 THROMBOCYTOPENIA (H): Primary | ICD-10-CM

## 2019-12-27 LAB — COPATH REPORT: NORMAL

## 2019-12-27 NOTE — TELEPHONE ENCOUNTER
You placed a referral for patient to onc/heme on 12/11/19.  Patient has not scheduled as of yet.      Please review and forward to team if follow up with the patient is needed.     Thank you!  [place]/Clinic Referrals [facility]

## 2019-12-29 NOTE — TELEPHONE ENCOUNTER
I would like for him to make a consult appointment with hematology for his low platelets. I would also like him to come in for lab recheck of this. I would like an update on his status as well.     Josue Eng PA-C  [facility]

## 2019-12-30 NOTE — TELEPHONE ENCOUNTER
Contacted patient he stated he would call to schedule with hematology.  I also scheduled him for lab appointment tomorrow for recheck.  He stated he is feeling some what better and has gained 5 pounds.  Will forward to [facility] as an FYI.

## 2019-12-30 NOTE — TELEPHONE ENCOUNTER
ONCOLOGY INTAKE: Records Information      APPT INFORMATION:  Referring provider: Miriam Fortune PA-C   Referring provider s clinic:  [facility]  Reason for visit/diagnosis:  Thrombocytopenia   Has patient been notified of appointment date and time?: Yes    RECORDS INFORMATION:  Were the records received with the referral (via [employer])? No,Internal Referral      Has patient been seen for any external appt for this diagnosis? No    If yes, where? NA    Has patient had any imaging or procedures outside of [place] for this condition? No      If Yes, where? NA    ADDITIONAL INFORMATION:  Patient didn't need to be on the wait list.

## 2019-12-31 DIAGNOSIS — D69.6 THROMBOCYTOPENIA (H): ICD-10-CM

## 2019-12-31 LAB
ERYTHROCYTE [DISTWIDTH] IN BLOOD BY AUTOMATED COUNT: 13.1 % (ref 10–15)
HCT VFR BLD AUTO: 44.2 % (ref 40–53)
HGB BLD-MCNC: 15 G/DL (ref 13.3–17.7)
MCH RBC QN AUTO: 30.3 PG (ref 26.5–33)
MCHC RBC AUTO-ENTMCNC: 33.9 G/DL (ref 31.5–36.5)
MCV RBC AUTO: 89 FL (ref 78–100)
PLATELET # BLD AUTO: 147 10E9/L (ref 150–450)
RBC # BLD AUTO: 4.95 10E12/L (ref 4.4–5.9)
WBC # BLD AUTO: 7 10E9/L (ref 4–11)

## 2019-12-31 PROCEDURE — 85027 COMPLETE CBC AUTOMATED: CPT | Performed by: PHYSICIAN ASSISTANT

## 2019-12-31 PROCEDURE — 36415 COLL VENOUS BLD VENIPUNCTURE: CPT | Performed by: PHYSICIAN ASSISTANT

## 2019-12-31 NOTE — RESULT ENCOUNTER NOTE
Hello Duane    Your results show slight improvement in platelets.    The results are attached for your review.       Josue Eng PA-C

## 2019-12-31 NOTE — TELEPHONE ENCOUNTER
RECORDS STATUS - ALL OTHER DIAGNOSIS      RECORDS RECEIVED FROM: Epic/   DATE RECEIVED: 1/28/2020   NOTES STATUS DETAILS   OFFICE NOTE from referring provider Complete  Miriam Fortune PA-C    OFFICE NOTE from medical oncologist n/A    DISCHARGE SUMMARY from hospital     DISCHARGE REPORT from the ER     OPERATIVE REPORT N/A    MEDICATION LIST Complete [facility]   CLINICAL TRIAL TREATMENTS TO DATE     LABS     PATHOLOGY REPORTS     ANYTHING RELATED TO DIAGNOSIS Complete Labs last updated on 12/31/2019    GENONOMIC TESTING     TYPE:     IMAGING (NEED IMAGES & REPORT)     CT SCANS     MRI     MAMMO     ULTRASOUND     PET       Labs are up to date in [facility].

## 2020-01-14 ENCOUNTER — MYC MEDICAL ADVICE (OUTPATIENT)
Dept: FAMILY MEDICINE | Facility: OTHER | Age: 70
End: 2020-01-14

## 2020-01-14 NOTE — TELEPHONE ENCOUNTER
Scheduled patient as he is due to be seen for a follow up. Advised he ask about this at his appointment.   Yenny Smith MA

## 2020-01-14 NOTE — PROGRESS NOTES
Subjective     Duane Demann is a 69 year old male who presents to clinic today for the following health issues:      History of Present Illness        He eats 2-3 servings of fruits and vegetables daily.He consumes 1 sweetened beverage(s) daily.He exercises with enough effort to increase his heart rate 10 to 19 minutes per day.  He exercises with enough effort to increase his heart rate 6 days per week. He is missing 7 dose(s) of medications per week.  He is not taking prescribed medications regularly due to other.     Depression and Anxiety Follow-Up    How are you doing with your depression since your last visit? No change    How are you doing with your anxiety since your last visit?  Improved     Are you having other symptoms that might be associated with depression or anxiety? Yes:  Antsy     Have you had a significant life event? No     Do you have any concerns with your use of alcohol or other drugs? No    - Never started medications (Remeron)   - Here for lyme lab, talked to a lot of people and worried he has it   - Gained 5 lbs   - CBD oil instead, helps with sleeping and anxiety   - Jittery, buzzing in ears - few years     All the time   - Hematology end of the month           Social History     Tobacco Use     Smoking status: Former Smoker     Packs/day: 2.00     Years: 9.00     Pack years: 18.00     Types: Cigarettes     Start date: 1968     Last attempt to quit: 1977     Years since quittin.0     Smokeless tobacco: Never Used     Tobacco comment: from  until  - 1-2 packs per day    Substance Use Topics     Alcohol use: Yes     Alcohol/week: 0.0 standard drinks     Comment: infrequently     Drug use: No     PHQ 2019 1/15/2020   PHQ-9 Total Score 11 2   Q9: Thoughts of better off dead/self-harm past 2 weeks Not at all Not at all     DEX-7 SCORE 2019 1/15/2020   Total Score 15 (severe anxiety) 13 (moderate anxiety)   Total Score 15 13       Review of Systems   ROS COMP:  "Constitutional, HEENT, cardiovascular, pulmonary, gi and gu systems are negative, except as otherwise noted.      Objective    /86   Pulse 80   Temp 97.8  F (36.6  C) (Temporal)   Resp 16   Ht 1.712 m (5' 7.42\")   Wt 66.9 kg (147 lb 6.4 oz)   SpO2 98%   BMI 22.80 kg/m    Body mass index is 22.8 kg/m .  Physical Exam   GENERAL APPEARANCE: healthy, alert and no distress  EYES: Eyes grossly normal to inspection, PERRLA, conjunctivae and sclerae without injection or discharge, EOM intact   HEENT: Bilateral canals normal with no erythema or cerumen, bilateral TMs normal without effusion, injection, or bulging   RESP: Lungs clear to auscultation - no rales, rhonchi or wheezes    CV: Regular rates and rhythm, normal S1 S2, no S3 or S4, no murmur, click or rub, no peripheral edema and peripheral pulses strong and symmetric bilaterally   MS: No musculoskeletal defects are noted and gait is age appropriate without ataxia   SKIN: No suspicious lesions or rashes, hydration status appears adeuqate with normal skin turgor   PSYCH: Alert and oriented x3; speech- coherent , normal rate and volume; able to articulate logical thoughts, able to abstract reason, no tangential thoughts, no hallucinations or delusions, mentation appears normal, Mood is euthymic. Affect is appropriate for this mood state and bright. Thought content is free of suicidal ideation, hallucinations, and delusions. Dress is adequate and upkept. Eye contact is good during conversation.         Diagnostic Test Results:  Labs reviewed in Epic  See orders pending in Epic         Assessment & Plan       ICD-10-CM    1. Unintended weight loss R63.4 Lyme Disease Melissa with reflex to WB Serum   2. Situational anxiety F41.8    3. Tick bite, initial encounter W57.XXXA Lyme Disease Melissa with reflex to WB Serum   4. Thrombocytopenia (H) D69.6    5. Tinnitus, bilateral H93.13      - Patient was previously seen for array of symptoms but mainly unintentional weight " "loss   - Work up to this point has been negative except for thrombocytopenia       Patient has consult with hematology upcoming   - Discussed anxiety as a factor and previously recommended Remeron (to also aid in weight gain)   - Patient did not start this      Decided to do CBD oil instead, is helping sleep better   - Discussed return to clinic if anxiety worsens and can re-visit SSRI therapy   - Patient requesting Lyme testing, many tick bites in the past      Everyone he has talked to reports similar symptoms and recommended this to him      Will allow this, await results   - New symptom but has been there \"for years\" is buzzing in ears with occasional light headedness      No fluid behind TMs      Recommend trial of modified Eply's, discussed how to do      If persists, will recommend hearing evaluation and ENT consult   - Discussed warning signs that would warrant return to clinic/ED    The patient indicates understanding of these issues and agrees with the plan.    Return in about 1 month (around 2/15/2020) for if not improving.    Miriam Eng PA-C  [facility]    "

## 2020-01-15 ENCOUNTER — OFFICE VISIT (OUTPATIENT)
Dept: FAMILY MEDICINE | Facility: OTHER | Age: 70
End: 2020-01-15
Payer: MEDICARE

## 2020-01-15 VITALS
HEIGHT: 67 IN | RESPIRATION RATE: 16 BRPM | SYSTOLIC BLOOD PRESSURE: 138 MMHG | HEART RATE: 80 BPM | BODY MASS INDEX: 23.13 KG/M2 | WEIGHT: 147.4 LBS | DIASTOLIC BLOOD PRESSURE: 86 MMHG | TEMPERATURE: 97.8 F | OXYGEN SATURATION: 98 %
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
## 2020-01-15 DIAGNOSIS — D69.6 THROMBOCYTOPENIA (H): ICD-10-CM

## 2020-01-15 DIAGNOSIS — F41.8 SITUATIONAL ANXIETY: ICD-10-CM

## 2020-01-15 DIAGNOSIS — H93.13 TINNITUS, BILATERAL: ICD-10-CM

## 2020-01-15 DIAGNOSIS — R63.4 UNINTENDED WEIGHT LOSS: Primary | ICD-10-CM

## 2020-01-15 PROCEDURE — 36415 COLL VENOUS BLD VENIPUNCTURE: CPT | Performed by: PHYSICIAN ASSISTANT

## 2020-01-15 PROCEDURE — 99214 OFFICE O/P EST MOD 30 MIN: CPT | Performed by: PHYSICIAN ASSISTANT

## 2020-01-15 PROCEDURE — 86618 LYME DISEASE ANTIBODY: CPT | Performed by: PHYSICIAN ASSISTANT

## 2020-01-15 ASSESSMENT — PATIENT HEALTH QUESTIONNAIRE - PHQ9
SUM OF ALL RESPONSES TO PHQ QUESTIONS 1-9: 2
SUM OF ALL RESPONSES TO PHQ QUESTIONS 1-9: 2
10. IF YOU CHECKED OFF ANY PROBLEMS, HOW DIFFICULT HAVE THESE PROBLEMS MADE IT FOR YOU TO DO YOUR WORK, TAKE CARE OF THINGS AT HOME, OR GET ALONG WITH OTHER PEOPLE: NOT DIFFICULT AT ALL

## 2020-01-15 ASSESSMENT — ANXIETY QUESTIONNAIRES
1. FEELING NERVOUS, ANXIOUS, OR ON EDGE: NEARLY EVERY DAY
4. TROUBLE RELAXING: SEVERAL DAYS
3. WORRYING TOO MUCH ABOUT DIFFERENT THINGS: NEARLY EVERY DAY
GAD7 TOTAL SCORE: 13
GAD7 TOTAL SCORE: 13
6. BECOMING EASILY ANNOYED OR IRRITABLE: MORE THAN HALF THE DAYS
5. BEING SO RESTLESS THAT IT IS HARD TO SIT STILL: SEVERAL DAYS
7. FEELING AFRAID AS IF SOMETHING AWFUL MIGHT HAPPEN: SEVERAL DAYS
2. NOT BEING ABLE TO STOP OR CONTROL WORRYING: MORE THAN HALF THE DAYS
7. FEELING AFRAID AS IF SOMETHING AWFUL MIGHT HAPPEN: SEVERAL DAYS
GAD7 TOTAL SCORE: 13

## 2020-01-15 ASSESSMENT — MIFFLIN-ST. JEOR: SCORE: 1408.91

## 2020-01-15 ASSESSMENT — PAIN SCALES - GENERAL: PAINLEVEL: NO PAIN (0)

## 2020-01-16 LAB — B BURGDOR IGG+IGM SER QL: 0.01 (ref 0–0.89)

## 2020-01-16 ASSESSMENT — ANXIETY QUESTIONNAIRES: GAD7 TOTAL SCORE: 13

## 2020-01-16 ASSESSMENT — PATIENT HEALTH QUESTIONNAIRE - PHQ9: SUM OF ALL RESPONSES TO PHQ QUESTIONS 1-9: 2

## 2020-01-16 NOTE — RESULT ENCOUNTER NOTE
Hello Duane    Your results were negative for Lyme disease.     The results are attached for your review.       Josue Eng PA-C

## 2020-01-28 ENCOUNTER — ONCOLOGY VISIT (OUTPATIENT)
Dept: ONCOLOGY | Facility: CLINIC | Age: 70
End: 2020-01-28
Attending: PHYSICIAN ASSISTANT
Payer: MEDICARE

## 2020-01-28 ENCOUNTER — PRE VISIT (OUTPATIENT)
Dept: ONCOLOGY | Facility: CLINIC | Age: 70
End: 2020-01-28

## 2020-01-28 VITALS
TEMPERATURE: 97.4 F | DIASTOLIC BLOOD PRESSURE: 98 MMHG | HEIGHT: 67 IN | BODY MASS INDEX: 23.67 KG/M2 | WEIGHT: 150.8 LBS | SYSTOLIC BLOOD PRESSURE: 178 MMHG | RESPIRATION RATE: 16 BRPM | OXYGEN SATURATION: 100 % | HEART RATE: 65 BPM

## 2020-01-28 DIAGNOSIS — Z11.59 ENCOUNTER FOR SCREENING FOR OTHER VIRAL DISEASES: ICD-10-CM

## 2020-01-28 DIAGNOSIS — D69.6 THROMBOCYTOPENIA (H): ICD-10-CM

## 2020-01-28 LAB
BASOPHILS # BLD AUTO: 0.1 10E9/L (ref 0–0.2)
BASOPHILS NFR BLD AUTO: 1 %
DIFFERENTIAL METHOD BLD: ABNORMAL
EOSINOPHIL # BLD AUTO: 0.2 10E9/L (ref 0–0.7)
EOSINOPHIL NFR BLD AUTO: 3.4 %
ERYTHROCYTE [DISTWIDTH] IN BLOOD BY AUTOMATED COUNT: 12.5 % (ref 10–15)
HCT VFR BLD AUTO: 46.4 % (ref 40–53)
HGB BLD-MCNC: 15.2 G/DL (ref 13.3–17.7)
IMM GRANULOCYTES # BLD: 0 10E9/L (ref 0–0.4)
IMM GRANULOCYTES NFR BLD: 0.2 %
LYMPHOCYTES # BLD AUTO: 1.6 10E9/L (ref 0.8–5.3)
LYMPHOCYTES NFR BLD AUTO: 25.9 %
MCH RBC QN AUTO: 29.6 PG (ref 26.5–33)
MCHC RBC AUTO-ENTMCNC: 32.8 G/DL (ref 31.5–36.5)
MCV RBC AUTO: 90 FL (ref 78–100)
MONOCYTES # BLD AUTO: 0.5 10E9/L (ref 0–1.3)
MONOCYTES NFR BLD AUTO: 8.6 %
NEUTROPHILS # BLD AUTO: 3.8 10E9/L (ref 1.6–8.3)
NEUTROPHILS NFR BLD AUTO: 60.9 %
PLATELET # BLD AUTO: 130 10E9/L (ref 150–450)
RBC # BLD AUTO: 5.13 10E12/L (ref 4.4–5.9)
RETICS # AUTO: 65.7 10E9/L (ref 25–95)
RETICS/RBC NFR AUTO: 1.3 % (ref 0.5–2)
WBC # BLD AUTO: 6.3 10E9/L (ref 4–11)

## 2020-01-28 PROCEDURE — G0499 HEPB SCREEN HIGH RISK INDIV: HCPCS | Performed by: INTERNAL MEDICINE

## 2020-01-28 PROCEDURE — 86706 HEP B SURFACE ANTIBODY: CPT | Performed by: INTERNAL MEDICINE

## 2020-01-28 PROCEDURE — 36415 COLL VENOUS BLD VENIPUNCTURE: CPT | Performed by: INTERNAL MEDICINE

## 2020-01-28 PROCEDURE — 40000611 ZZHCL STATISTIC MORPHOLOGY W/INTERP HEMEPATH TC 85060: Performed by: INTERNAL MEDICINE

## 2020-01-28 PROCEDURE — 86704 HEP B CORE ANTIBODY TOTAL: CPT | Performed by: INTERNAL MEDICINE

## 2020-01-28 PROCEDURE — 85045 AUTOMATED RETICULOCYTE COUNT: CPT | Performed by: INTERNAL MEDICINE

## 2020-01-28 PROCEDURE — 99215 OFFICE O/P EST HI 40 MIN: CPT | Performed by: INTERNAL MEDICINE

## 2020-01-28 PROCEDURE — 87389 HIV-1 AG W/HIV-1&-2 AB AG IA: CPT | Performed by: INTERNAL MEDICINE

## 2020-01-28 PROCEDURE — 85025 COMPLETE CBC W/AUTO DIFF WBC: CPT | Performed by: INTERNAL MEDICINE

## 2020-01-28 ASSESSMENT — MIFFLIN-ST. JEOR: SCORE: 1424.03

## 2020-01-28 ASSESSMENT — PAIN SCALES - GENERAL: PAINLEVEL: NO PAIN (0)

## 2020-01-28 NOTE — PROGRESS NOTES
Hematology initial visit:  Date on this visit: 1/28/2020    Duane Demann  is referred by Dr.Cassondra ОЛЕГ Fortune for a hematology consultation. He requires evaluation for new diagnosis of thrombocytopenia.    Primary Physician: Tiffanie Gupta     History Of Present Illness:  Mr. Copeland is a 69 year old male who comes in today regarding evaluation for thrombocytopenia.  He tells me that around the spring time in 2019 he was very busy with his housework and was also not eating well and also had some problems with the teeth and he lost quite a bit of her weight over several months and lost about 35 pounds.  Then he started to eat better and has gained back about 10 pounds over the last couple of months.  During this time he also was noticing drenching night sweats but these have also resolved over the last month.  He otherwise feels well and has good energy and remains fully functional.  At times he feels anxious and does not have the motivation to do things but overall is doing well.  Denies new swellings.  No issues with bleeding.  No shortness of breath.  No nausea vomiting diarrhea constipation.  No neuropathy or new skin problems.  Denies any recent change in medications.  He only started taking hemp oil about 3 weeks ago as he was having trouble sleeping.  He takes his multivitamins.  During this time he had work-up done which showed that his platelet count was mildly low.  CT chest abdomen and pelvis in December 2019 showed tiny scattered pulmonary nodules which were indeterminate.  Normal liver and spleen size.  No lymphadenopathy.  Nonspecific haziness of the omentum was seen.  No pathology was identified which could have attributed to his weight loss.  Vitamin B12 and folate levels were normal.  Hepatitis C antibody was positive but hepatitis C virus RNA was undetectable.  Other than mild thrombocytopenia, rest of the CBC was unremarkable.  CMP was also unremarkable.          ROS:  A  comprehensive ROS was otherwise neg    Past Medical/Surgical History:  Past Medical History:   Diagnosis Date     History of tobacco use  -     about 1 ppd     Past Surgical History:   Procedure Laterality Date     C APPENDECTOMY       C STRESS ECHO TEST NL      [facility]     COLONOSCOPY  2011    COLONOSCOPY performed by TEDDY GARCIA at  GI     COLONOSCOPY N/A 4/3/2017    Procedure: COMBINED COLONOSCOPY, SINGLE OR MULTIPLE BIOPSY/POLYPECTOMY BY BIOPSY;  Surgeon: Issac Barrera MD;  Location:  GI     ESOPHAGOSCOPY, GASTROSCOPY, DUODENOSCOPY (EGD), COMBINED N/A 2019    Procedure: ESOPHAGOGASTRODUODENOSCOPY (EGD);  Surgeon: Moisés Blandon DO;  Location:  GI     HC REVISE MEDIAN N/CARPAL TUNNEL SURG      Bilateral     HC VASECTOMY UNILAT/BILAT W POSTOP SEMEN      Vasectomy     HERNIORRHAPHY INGUINAL Right 2015    Procedure: HERNIORRHAPHY INGUINAL;  Surgeon: Domingo Ramsay MD;  Location: [facility] OR     SIGMOIDOSCOPY,BIOPSY   or so    reported as normal- [facility]     Allergies:  Allergies as of 2020 - Reviewed 2020   Allergen Reaction Noted     No known drug allergies  2005     Current Medications:  Current Outpatient Medications   Medication Sig Dispense Refill     GARLIC 2 times daily        HEMP OIL OR EXTRACT OR OTHER CBD CANNABINOID, NOT MEDICAL CANNABIS, At Bedtime       MAGNESIUM PO Take by mouth 2 times daily       VITAMIN C-VITAMIN D-ZINC PO        VITAMIN E PO Take by mouth 2 times daily       aspirin 81 MG tablet Take 81 mg by mouth daily       CALCIUM 500 MG PO TABS Take by mouth 2 times daily         Family History:  Family History   Problem Relation Age of Onset     Respiratory Mother         Emphysema- she was a smoker,  at 81 yo     Hypertension Sister      Hypertension Brother      Lipids Brother      Unknown/Adopted Father      Hypertension Brother      Lipids Brother      Hypertension  Sister      Asthma No family hx of      C.A.D. No family hx of      Diabetes No family hx of      Cerebrovascular Disease No family hx of      Breast Cancer No family hx of      Cancer - colorectal No family hx of      Prostate Cancer No family hx of      Cancer No family hx of      Cardiovascular No family hx of      Blood Disease No family hx of      Arthritis No family hx of      Alzheimer Disease No family hx of      Circulatory No family hx of      Alcohol/Drug No family hx of      Eye Disorder No family hx of      Depression No family hx of      Gastrointestinal Disease No family hx of      Genitourinary Problems No family hx of      Heart Disease No family hx of      Musculoskeletal Disorder No family hx of      Neurologic Disorder No family hx of      Thyroid Disease No family hx of      No family history of bleeding or clotting disorder.  No history of cancers in the family.  Social History:  Social History     Socioeconomic History     Marital status:      Spouse name: Not on file     Number of children: Not on file     Years of education: Not on file     Highest education level: Not on file   Occupational History     Not on file   Social Needs     Financial resource strain: Not on file     Food insecurity:     Worry: Not on file     Inability: Not on file     Transportation needs:     Medical: Not on file     Non-medical: Not on file   Tobacco Use     Smoking status: Former Smoker     Packs/day: 2.00     Years: 9.00     Pack years: 18.00     Types: Cigarettes     Start date: 1968     Last attempt to quit: 1977     Years since quittin.1     Smokeless tobacco: Never Used     Tobacco comment: from  until  - 1-2 packs per day    Substance and Sexual Activity     Alcohol use: Yes     Alcohol/week: 0.0 standard drinks     Comment: infrequently     Drug use: No     Sexual activity: Yes     Partners: Female   Lifestyle     Physical activity:     Days per week: Not on file     Minutes  "per session: Not on file     Stress: Not on file   Relationships     Social connections:     Talks on phone: Not on file     Gets together: Not on file     Attends Church service: Not on file     Active member of club or organization: Not on file     Attends meetings of clubs or organizations: Not on file     Relationship status: Not on file     Intimate partner violence:     Fear of current or ex partner: Not on file     Emotionally abused: Not on file     Physically abused: Not on file     Forced sexual activity: Not on file   Other Topics Concern     Parent/sibling w/ CABG, MI or angioplasty before 65F 55M? Not Asked   Social History Narrative     Not on file   He used to smoke but quit at the age of 28.  He drinks alcohol very occasionally.  He lives with his wife.  He used to work for [employer] and retired in the spring 2019.  Physical Exam:  BP (!) 178/98 (BP Location: Right arm)   Pulse 65   Temp 97.4  F (36.3  C) (Oral)   Resp 16   Ht 1.712 m (5' 7.4\")   Wt 68.4 kg (150 lb 12.8 oz)   SpO2 100%   BMI 23.34 kg/m     Wt Readings from Last 4 Encounters:   01/28/20 68.4 kg (150 lb 12.8 oz)   01/15/20 66.9 kg (147 lb 6.4 oz)   12/17/19 64.7 kg (142 lb 9.6 oz)   12/11/19 65 kg (143 lb 3.2 oz)     CONSTITUTIONAL: no acute distress  EYES: PERRLA, no palor or icterus.   ENT/MOUTH: no mouth lesions. Ears normal  CVS: s1s2 no m r g .   RESPIRATORY: clear to auscultation b/l  GI: soft non tender no hepatosplenomegaly  NEURO: AAOX3  Grossly non focal neuro exam  INTEGUMENT: no obvious rashes  LYMPHATIC: no palpable cervical, supraclavicular, axillary or inguinal LAD  MUSCULOSKELETAL: Unremarkable. No bony tenderness.   EXTREMITIES: no edema  PSYCH: Mentation, mood and affect are normal. Decision making capacity is intact    Laboratory/Imaging Studies      Reviewed    EXAMINATION: CT ABDOMEN PELVIS W CONTRAST, 12/13/2019 10:27 AM     TECHNIQUE:  Helical CT images from the lung bases through the  symphysis " pubis were obtained with IV contrast. Contrast dose: 88 ml  isovue 370     COMPARISON: None     HISTORY: Weight loss, unintended, non-localized abd pain     FINDINGS:     Abdomen and pelvis: There is a 5 mm hypoattenuating lesion in the left  lobe of the liver, likely representing a simple hepatic cyst (3 series  2 image 13). The gallbladder, pancreas, spleen and adrenal glands are  unremarkable. The main portal is patent. No intrahepatic or extra  hepatic biliary ductal dilation. The right kidney demonstrates normal  nephrogenic enhancement without evidence of hydronephrosis, stone or  mass lesion. There is a 3 mm hypoattenuating lesion in the inferior  pole of the left kidney, too small to characterize (series 2 image  34). Left-sided peripelvic cysts. Heterogeneous enhancement in the  prostate peripheral zone. Mild cervical central bladder wall  thickening. No pelvic masses. The bowel is nondilated. Small calcified  diverticulum in the distal descending colon. Appendix is surgically  absent. Mild omental haziness without focal nodularity.     Mild calcific atherosclerotic disease of the abdominal aorta which is  normal in caliber. Major abdominal vessels are patent. No  intra-abdominal, retroperitoneal or inguinal lymphadenopathy. No  pneumoperitoneum or free fluid.     Lung bases: The heart is not enlarged. No pericardial effusion. 3 mm  solid right lower lobe pulmonary nodule (series 3 image 3) and a 3 mm  solid left lower lobe pulmonary nodule (series 3 image 20).     Bones and soft tissues: Mild degenerative changes of the spine. No  suspicious osseous lesions.                                                                      IMPRESSION:   1. No definite finding to explain the patient's unintended weight  loss.  2. Nonspecific mild omental haziness. No focal nodularity.  3. Heterogeneously enhancing prostate peripheral zone is of uncertain  clinical significance. Correlate with patient's symptoms and  PSA.  4. Sub-6 mm solid pulmonary nodules. Recommend follow-up per  Fleischner Society criteria guidelines.    CT CHEST WITH CONTRAST 12/19/2019 12:39 PM      HISTORY: Lung nodule (Pulmonary nodule). Pulmonary nodules. Unintended  weight loss.     COMPARISON: Abdomen and pelvis CT from December 13, 2019.     TECHNIQUE: Volumetric helical acquisition of CT images of the chest  from the clavicles to the kidneys were acquired after the  administration of 75 mL, Isovue 370 IV contrast. Radiation dose for  this scan was reduced using automated exposure control, adjustment of  the mA and/or kV according to patient size, or iterative  reconstruction technique.     FINDINGS:  A few scattered sub-6 mm nodules are again noted at the  lung bases, a few in the upper lobes. These are peripheral and  generally not a bronchovascular distribution. No pleural or  pericardial effusion. Normal caliber aorta. Normal heart size.  Unremarkable thyroid. No adenopathy in the chest. No acute findings in  the visualized upper abdomen.                                                                      IMPRESSION: A few sub-6 mm nodules are noted bilaterally which are  nonspecific.     Recommendations for one or multiple incidental lung nodules < 6mm :    Low risk patients: No routine follow-up.    High risk patients: Optional follow-up CT at 12 months; if  unchanged, no further follow-up.     *Low Risk: Minimal or absent history of smoking or other known risk  factors.  *Nonsolid (ground glass) or partly solid nodules may require longer  follow-up to exclude indolent adenocarcinoma.  *Recommendations based on Guidelines for the Management of Incidental  Pulmonary Nodules Detected at CT: From the Fleischner Society 2017,  Radiology 2017.       ASSESSMENT/PLAN:    Thrombocytopenia.  He has isolated thrombocytopenia at least since 2015.  Some testing for it has been done and comprehensive metabolic panel, folate and B12 have been normal  recently.  Although hepatitis C antibody was positive but hepatitis C virus RNA was undetectable.  Recent CT chest abdomen and pelvis did not show any hepatosplenomegaly.  There were nonspecific tiny pulmonary nodules and nonspecific omental haziness but no acute pathology was noted.  I would like to do and I would like to check peripheral blood smear, hepatitis B and HIV.    Weight loss/night sweats.  He had significant weight loss over several months last year but this was in the setting when he was very busy with household work and was not eating well and had problems with the teeth.  He also had night sweats at that time.  Over the last couple of months he has started to eat better and has gained back about 10 pounds.  His night sweats have also resolved over the last 1 month.  Recent testing with CT chest abdomen pelvis did not show any pathology which is contributing to the weight loss.  He did not have any abnormal lymphadenopathy, hepatosplenomegaly.  Tiny nonspecific lung nodules were found and nonspecific omental haziness was found.  As his symptoms of weight loss and night sweats have resolved, it is reasonable to observe this for now.    Elevated blood pressure.  His blood pressure in the clinic was elevated.  He is asymptomatic.  I recommend that he checks his blood pressures at home regularly when he is relaxed and keep a log of it.  If it is persistently high then he should talk to his primary care physician for better blood pressure management.  I am not starting antihypertensive medications today.    Lung nodules- tiny non specific lung nodules- follow with PCP/Lung nodule clinic.    I would like to see him back in about a month with repeat labs prior.    All questions answered.  He is agreeable and comfortable with the plan.    Belen Powell MD

## 2020-01-28 NOTE — LETTER
1/28/2020         RE: Duane Demann  [address] [facility] [id number]        Dear Colleague,    Thank you for referring your patient, Duane Demann, to the [facility]. Please see a copy of my visit note below.    Hematology initial visit:  Date on this visit: 1/28/2020    Duane Demann  is referred by Dr.Cassondra ОЛЕГ Fortune for a hematology consultation. He requires evaluation for new diagnosis of thrombocytopenia.    Primary Physician: Tiffanie Gupta     History Of Present Illness:  Mr. Copeland is a 69 year old male who comes in today regarding evaluation for thrombocytopenia.  He tells me that around the spring time in 2019 he was very busy with his housework and was also not eating well and also had some problems with the teeth and he lost quite a bit of her weight over several months and lost about 35 pounds.  Then he started to eat better and has gained back about 10 pounds over the last couple of months.  During this time he also was noticing drenching night sweats but these have also resolved over the last month.  He otherwise feels well and has good energy and remains fully functional.  At times he feels anxious and does not have the motivation to do things but overall is doing well.  Denies new swellings.  No issues with bleeding.  No shortness of breath.  No nausea vomiting diarrhea constipation.  No neuropathy or new skin problems.  Denies any recent change in medications.  He only started taking hemp oil about 3 weeks ago as he was having trouble sleeping.  He takes his multivitamins.  During this time he had work-up done which showed that his platelet count was mildly low.  CT chest abdomen and pelvis in December 2019 showed tiny scattered pulmonary nodules which were indeterminate.  Normal liver and spleen size.  No lymphadenopathy.  Nonspecific haziness of the omentum was seen.  No pathology was identified which could have attributed to his weight  loss.  Vitamin B12 and folate levels were normal.  Hepatitis C antibody was positive but hepatitis C virus RNA was undetectable.  Other than mild thrombocytopenia, rest of the CBC was unremarkable.  CMP was also unremarkable.          ROS:  A comprehensive ROS was otherwise neg    Past Medical/Surgical History:  Past Medical History:   Diagnosis Date     History of tobacco use 1968 - 1980    about 1 ppd     Past Surgical History:   Procedure Laterality Date     C APPENDECTOMY  1968     C STRESS ECHO TEST NL  2005    normal- [facility]     COLONOSCOPY  1/31/2011    COLONOSCOPY performed by TEDDY GARCIA at  GI     COLONOSCOPY N/A 4/3/2017    Procedure: COMBINED COLONOSCOPY, SINGLE OR MULTIPLE BIOPSY/POLYPECTOMY BY BIOPSY;  Surgeon: Issac Barrera MD;  Location:  GI     ESOPHAGOSCOPY, GASTROSCOPY, DUODENOSCOPY (EGD), COMBINED N/A 12/24/2019    Procedure: ESOPHAGOGASTRODUODENOSCOPY (EGD);  Surgeon: Moisés Blandon DO;  Location:  GI     HC REVISE MEDIAN N/CARPAL TUNNEL SURG  1979    Bilateral     HC VASECTOMY UNILAT/BILAT W POSTOP SEMEN  1996    Vasectomy     HERNIORRHAPHY INGUINAL Right 9/4/2015    Procedure: HERNIORRHAPHY INGUINAL;  Surgeon: Domingo Ramsay MD;  Location: [facility]     SIGMOIDOSCOPY,BIOPSY  2004 or so    reported as normal- [facility]     Allergies:  Allergies as of 01/28/2020 - Reviewed 01/28/2020   Allergen Reaction Noted     No known drug allergies  05/31/2005     Current Medications:  Current Outpatient Medications   Medication Sig Dispense Refill     GARLIC 2 times daily        HEMP OIL OR EXTRACT OR OTHER CBD CANNABINOID, NOT MEDICAL CANNABIS, At Bedtime       MAGNESIUM PO Take by mouth 2 times daily       VITAMIN C-VITAMIN D-ZINC PO        VITAMIN E PO Take by mouth 2 times daily       aspirin 81 MG tablet Take 81 mg by mouth daily       CALCIUM 500 MG PO TABS Take by mouth 2 times daily         Family History:  Family History   Problem Relation Age of  Onset     Respiratory Mother         Emphysema- she was a smoker,  at 79 yo     Hypertension Sister      Hypertension Brother      Lipids Brother      Unknown/Adopted Father      Hypertension Brother      Lipids Brother      Hypertension Sister      Asthma No family hx of      C.A.D. No family hx of      Diabetes No family hx of      Cerebrovascular Disease No family hx of      Breast Cancer No family hx of      Cancer - colorectal No family hx of      Prostate Cancer No family hx of      Cancer No family hx of      Cardiovascular No family hx of      Blood Disease No family hx of      Arthritis No family hx of      Alzheimer Disease No family hx of      Circulatory No family hx of      Alcohol/Drug No family hx of      Eye Disorder No family hx of      Depression No family hx of      Gastrointestinal Disease No family hx of      Genitourinary Problems No family hx of      Heart Disease No family hx of      Musculoskeletal Disorder No family hx of      Neurologic Disorder No family hx of      Thyroid Disease No family hx of      No family history of bleeding or clotting disorder.  No history of cancers in the family.  Social History:  Social History     Socioeconomic History     Marital status:      Spouse name: Not on file     Number of children: Not on file     Years of education: Not on file     Highest education level: Not on file   Occupational History     Not on file   Social Needs     Financial resource strain: Not on file     Food insecurity:     Worry: Not on file     Inability: Not on file     Transportation needs:     Medical: Not on file     Non-medical: Not on file   Tobacco Use     Smoking status: Former Smoker     Packs/day: 2.00     Years: 9.00     Pack years: 18.00     Types: Cigarettes     Start date: 1968     Last attempt to quit: 1977     Years since quittin.1     Smokeless tobacco: Never Used     Tobacco comment: from  until  - 1-2 packs per day    Substance and  "Sexual Activity     Alcohol use: Yes     Alcohol/week: 0.0 standard drinks     Comment: infrequently     Drug use: No     Sexual activity: Yes     Partners: Female   Lifestyle     Physical activity:     Days per week: Not on file     Minutes per session: Not on file     Stress: Not on file   Relationships     Social connections:     Talks on phone: Not on file     Gets together: Not on file     Attends Alevism service: Not on file     Active member of club or organization: Not on file     Attends meetings of clubs or organizations: Not on file     Relationship status: Not on file     Intimate partner violence:     Fear of current or ex partner: Not on file     Emotionally abused: Not on file     Physically abused: Not on file     Forced sexual activity: Not on file   Other Topics Concern     Parent/sibling w/ CABG, MI or angioplasty before 65F 55M? Not Asked   Social History Narrative     Not on file   He used to smoke but quit at the age of 28.  He drinks alcohol very occasionally.  He lives with his wife.  He used to work for [employer] and retired in the spring 2019.  Physical Exam:  BP (!) 178/98 (BP Location: Right arm)   Pulse 65   Temp 97.4  F (36.3  C) (Oral)   Resp 16   Ht 1.712 m (5' 7.4\")   Wt 68.4 kg (150 lb 12.8 oz)   SpO2 100%   BMI 23.34 kg/m      Wt Readings from Last 4 Encounters:   01/28/20 68.4 kg (150 lb 12.8 oz)   01/15/20 66.9 kg (147 lb 6.4 oz)   12/17/19 64.7 kg (142 lb 9.6 oz)   12/11/19 65 kg (143 lb 3.2 oz)     CONSTITUTIONAL: no acute distress  EYES: PERRLA, no palor or icterus.   ENT/MOUTH: no mouth lesions. Ears normal  CVS: s1s2 no m r g .   RESPIRATORY: clear to auscultation b/l  GI: soft non tender no hepatosplenomegaly  NEURO: AAOX3  Grossly non focal neuro exam  INTEGUMENT: no obvious rashes  LYMPHATIC: no palpable cervical, supraclavicular, axillary or inguinal LAD  MUSCULOSKELETAL: Unremarkable. No bony tenderness.   EXTREMITIES: no edema  PSYCH: Mentation, mood and " affect are normal. Decision making capacity is intact    Laboratory/Imaging Studies      Reviewed    EXAMINATION: CT ABDOMEN PELVIS W CONTRAST, 12/13/2019 10:27 AM     TECHNIQUE:  Helical CT images from the lung bases through the  symphysis pubis were obtained with IV contrast. Contrast dose: 88 ml  isovue 370     COMPARISON: None     HISTORY: Weight loss, unintended, non-localized abd pain     FINDINGS:     Abdomen and pelvis: There is a 5 mm hypoattenuating lesion in the left  lobe of the liver, likely representing a simple hepatic cyst (3 series  2 image 13). The gallbladder, pancreas, spleen and adrenal glands are  unremarkable. The main portal is patent. No intrahepatic or extra  hepatic biliary ductal dilation. The right kidney demonstrates normal  nephrogenic enhancement without evidence of hydronephrosis, stone or  mass lesion. There is a 3 mm hypoattenuating lesion in the inferior  pole of the left kidney, too small to characterize (series 2 image  34). Left-sided peripelvic cysts. Heterogeneous enhancement in the  prostate peripheral zone. Mild cervical central bladder wall  thickening. No pelvic masses. The bowel is nondilated. Small calcified  diverticulum in the distal descending colon. Appendix is surgically  absent. Mild omental haziness without focal nodularity.     Mild calcific atherosclerotic disease of the abdominal aorta which is  normal in caliber. Major abdominal vessels are patent. No  intra-abdominal, retroperitoneal or inguinal lymphadenopathy. No  pneumoperitoneum or free fluid.     Lung bases: The heart is not enlarged. No pericardial effusion. 3 mm  solid right lower lobe pulmonary nodule (series 3 image 3) and a 3 mm  solid left lower lobe pulmonary nodule (series 3 image 20).     Bones and soft tissues: Mild degenerative changes of the spine. No  suspicious osseous lesions.                                                                      IMPRESSION:   1. No definite finding to  explain the patient's unintended weight  loss.  2. Nonspecific mild omental haziness. No focal nodularity.  3. Heterogeneously enhancing prostate peripheral zone is of uncertain  clinical significance. Correlate with patient's symptoms and PSA.  4. Sub-6 mm solid pulmonary nodules. Recommend follow-up per  Fleischner Society criteria guidelines.    CT CHEST WITH CONTRAST 12/19/2019 12:39 PM      HISTORY: Lung nodule (Pulmonary nodule). Pulmonary nodules. Unintended  weight loss.     COMPARISON: Abdomen and pelvis CT from December 13, 2019.     TECHNIQUE: Volumetric helical acquisition of CT images of the chest  from the clavicles to the kidneys were acquired after the  administration of 75 mL, Isovue 370 IV contrast. Radiation dose for  this scan was reduced using automated exposure control, adjustment of  the mA and/or kV according to patient size, or iterative  reconstruction technique.     FINDINGS:  A few scattered sub-6 mm nodules are again noted at the  lung bases, a few in the upper lobes. These are peripheral and  generally not a bronchovascular distribution. No pleural or  pericardial effusion. Normal caliber aorta. Normal heart size.  Unremarkable thyroid. No adenopathy in the chest. No acute findings in  the visualized upper abdomen.                                                                      IMPRESSION: A few sub-6 mm nodules are noted bilaterally which are  nonspecific.     Recommendations for one or multiple incidental lung nodules < 6mm :    Low risk patients: No routine follow-up.    High risk patients: Optional follow-up CT at 12 months; if  unchanged, no further follow-up.     *Low Risk: Minimal or absent history of smoking or other known risk  factors.  *Nonsolid (ground glass) or partly solid nodules may require longer  follow-up to exclude indolent adenocarcinoma.  *Recommendations based on Guidelines for the Management of Incidental  Pulmonary Nodules Detected at CT: From the  Fleischner Society 2017,  Radiology 2017.       ASSESSMENT/PLAN:    Thrombocytopenia.  He has isolated thrombocytopenia at least since 2015.  Some testing for it has been done and comprehensive metabolic panel, folate and B12 have been normal recently.  Although hepatitis C antibody was positive but hepatitis C virus RNA was undetectable.  Recent CT chest abdomen and pelvis did not show any hepatosplenomegaly.  There were nonspecific tiny pulmonary nodules and nonspecific omental haziness but no acute pathology was noted.  I would like to do and I would like to check peripheral blood smear, hepatitis B and HIV.    Weight loss/night sweats.  He had significant weight loss over several months last year but this was in the setting when he was very busy with household work and was not eating well and had problems with the teeth.  He also had night sweats at that time.  Over the last couple of months he has started to eat better and has gained back about 10 pounds.  His night sweats have also resolved over the last 1 month.  Recent testing with CT chest abdomen pelvis did not show any pathology which is contributing to the weight loss.  He did not have any abnormal lymphadenopathy, hepatosplenomegaly.  Tiny nonspecific lung nodules were found and nonspecific omental haziness was found.  As his symptoms of weight loss and night sweats have resolved, it is reasonable to observe this for now.    Elevated blood pressure.  His blood pressure in the clinic was elevated.  He is asymptomatic.  I recommend that he checks his blood pressures at home regularly when he is relaxed and keep a log of it.  If it is persistently high then he should talk to his primary care physician for better blood pressure management.  I am not starting antihypertensive medications today.    Lung nodules- tiny non specific lung nodules- follow with PCP/Lung nodule clinic.    I would like to see him back in about a month with repeat labs prior.    All  questions answered.  He is agreeable and comfortable with the plan.    Belen Powell MD              Again, thank you for allowing me to participate in the care of your patient.        Sincerely,        Belen Powell MD

## 2020-01-28 NOTE — NURSING NOTE
"Oncology Rooming Note    January 28, 2020 12:28 PM   Duane Demann is a 69 year old male who presents for:    Chief Complaint   Patient presents with     Oncology Clinic Visit     New Patient     Initial Vitals: BP (!) 178/98 (BP Location: Right arm)   Pulse 65   Temp 97.4  F (36.3  C) (Oral)   Resp 16   Ht 1.712 m (5' 7.4\")   Wt 68.4 kg (150 lb 12.8 oz)   SpO2 100%   BMI 23.34 kg/m   Estimated body mass index is 23.34 kg/m  as calculated from the following:    Height as of this encounter: 1.712 m (5' 7.4\").    Weight as of this encounter: 68.4 kg (150 lb 12.8 oz). Body surface area is 1.8 meters squared.  No Pain (0) Comment: Data Unavailable   No LMP for male patient.  Allergies reviewed: Yes  Medications reviewed: Yes    Medications: Medication refills not needed today.  Pharmacy name entered into EPIC: [id number] - [place], [place] - [address] [facility]    Danna Olivas LPN              "

## 2020-01-29 LAB
COPATH REPORT: NORMAL
HBV CORE AB SERPL QL IA: REACTIVE
HBV SURFACE AB SERPL IA-ACNC: 161.63 M[IU]/ML
HBV SURFACE AG SERPL QL IA: NONREACTIVE
HIV 1+2 AB+HIV1 P24 AG SERPL QL IA: NONREACTIVE

## 2020-01-30 DIAGNOSIS — D69.6 THROMBOCYTOPENIA (H): Primary | ICD-10-CM

## 2020-02-24 DIAGNOSIS — D69.6 THROMBOCYTOPENIA (H): ICD-10-CM

## 2020-02-24 LAB
BASOPHILS # BLD AUTO: 0 10E9/L (ref 0–0.2)
BASOPHILS NFR BLD AUTO: 0.8 %
DIFFERENTIAL METHOD BLD: ABNORMAL
EOSINOPHIL # BLD AUTO: 0.4 10E9/L (ref 0–0.7)
EOSINOPHIL NFR BLD AUTO: 7 %
ERYTHROCYTE [DISTWIDTH] IN BLOOD BY AUTOMATED COUNT: 13.1 % (ref 10–15)
HCT VFR BLD AUTO: 44.6 % (ref 40–53)
HGB BLD-MCNC: 15 G/DL (ref 13.3–17.7)
LYMPHOCYTES # BLD AUTO: 1.8 10E9/L (ref 0.8–5.3)
LYMPHOCYTES NFR BLD AUTO: 36.5 %
MCH RBC QN AUTO: 29.8 PG (ref 26.5–33)
MCHC RBC AUTO-ENTMCNC: 33.6 G/DL (ref 31.5–36.5)
MCV RBC AUTO: 89 FL (ref 78–100)
MONOCYTES # BLD AUTO: 0.5 10E9/L (ref 0–1.3)
MONOCYTES NFR BLD AUTO: 10.4 %
NEUTROPHILS # BLD AUTO: 2.3 10E9/L (ref 1.6–8.3)
NEUTROPHILS NFR BLD AUTO: 45.3 %
PLATELET # BLD AUTO: 90 10E9/L (ref 150–450)
RBC # BLD AUTO: 5.03 10E12/L (ref 4.4–5.9)
WBC # BLD AUTO: 5 10E9/L (ref 4–11)

## 2020-02-24 PROCEDURE — 85025 COMPLETE CBC W/AUTO DIFF WBC: CPT | Performed by: INTERNAL MEDICINE

## 2020-02-24 PROCEDURE — 36415 COLL VENOUS BLD VENIPUNCTURE: CPT | Performed by: INTERNAL MEDICINE

## 2020-02-25 ENCOUNTER — ONCOLOGY VISIT (OUTPATIENT)
Dept: ONCOLOGY | Facility: CLINIC | Age: 70
End: 2020-02-25
Attending: INTERNAL MEDICINE
Payer: MEDICARE

## 2020-02-25 VITALS
SYSTOLIC BLOOD PRESSURE: 166 MMHG | DIASTOLIC BLOOD PRESSURE: 95 MMHG | TEMPERATURE: 97.6 F | WEIGHT: 150 LBS | RESPIRATION RATE: 16 BRPM | OXYGEN SATURATION: 98 % | HEART RATE: 68 BPM | BODY MASS INDEX: 23.21 KG/M2

## 2020-02-25 DIAGNOSIS — D69.6 THROMBOCYTOPENIA (H): Primary | ICD-10-CM

## 2020-02-25 PROCEDURE — 99214 OFFICE O/P EST MOD 30 MIN: CPT | Performed by: INTERNAL MEDICINE

## 2020-02-25 ASSESSMENT — PAIN SCALES - GENERAL: PAINLEVEL: NO PAIN (0)

## 2020-02-25 NOTE — LETTER
2/25/2020         RE: Duane Demann  [address] [facility] [id number]        Dear Colleague,    Thank you for referring your patient, Duane Demann, to the [facility]. Please see a copy of my visit note below.    Hematology follow up visit:  Date on this visit: 2/25/2020     Duane Demann  is referred by Dr.Cassondra ОЛЕГ Fortune for a hematology consultation. He requires evaluation for new diagnosis of thrombocytopenia.    Primary Physician: Tiffanie Gupta     History Of Present Illness:    Please see my previous note for details.  I have copied and updated from prior note.  Mr. Copeland is a 69 year old male who I evaluated in January 2020 for thrombocytopenia.  He tells me that around the spring time in 2019 he was very busy with his housework and was also not eating well and also had some problems with the teeth and he lost quite a bit of her weight over several months and lost about 35 pounds.  Then he started to eat better and has gained back about 10 pounds over the last couple of months.  During this time he also was noticing drenching night sweats but these have also resolved over the last month.  He otherwise feels well and has good energy and remains fully functional.  At times he feels anxious and does not have the motivation to do things but overall is doing well.  Denies new swellings.  No issues with bleeding.  No shortness of breath.  No nausea vomiting diarrhea constipation.  No neuropathy or new skin problems.  Denies any recent change in medications.  He only started taking hemp oil about 3 weeks ago as he was having trouble sleeping.  He takes his multivitamins.  During this time he had work-up done which showed that his platelet count was mildly low.  CT chest abdomen and pelvis in December 2019 showed tiny scattered pulmonary nodules which were indeterminate.  Normal liver and spleen size.  No lymphadenopathy.  Nonspecific haziness of the omentum was seen.   No pathology was identified which could have attributed to his weight loss.  Vitamin B12 and folate levels were normal.  Hepatitis C antibody was positive but hepatitis C virus RNA was undetectable.  Other than mild thrombocytopenia, rest of the CBC was unremarkable.  CMP was also unremarkable.  I did further work-up and peripheral blood smear was essentially unremarkable apart from thrombocytopenia.  He was noted to have immunity from prior hepatitis B exposure as hepatitis B surface antibody and hepatitis B core antibody were positive while hepatitis B surface antigen was negative.  HIV was negative.      He comes in today and tells me overall he has been doing well.  He is now eating and drinking well and his weight has stabilized.  He denies any more night sweats.  No new swellings.  No issues with bleeding.  No shortness of breath.  Denies any infections.  Denies any GI problems.  Energy is good.        ROS:  A comprehensive ROS was otherwise neg.    I reviewed other history in epic as below.    Past Medical/Surgical History:  Past Medical History:   Diagnosis Date     History of tobacco use 1968 - 1980    about 1 ppd     Past Surgical History:   Procedure Laterality Date     C APPENDECTOMY  1968     C STRESS ECHO TEST NL  2005    normal- [facility]     COLONOSCOPY  1/31/2011    COLONOSCOPY performed by TEDDY GARCIA at  GI     COLONOSCOPY N/A 4/3/2017    Procedure: COMBINED COLONOSCOPY, SINGLE OR MULTIPLE BIOPSY/POLYPECTOMY BY BIOPSY;  Surgeon: Issac Barrera MD;  Location:  GI     ESOPHAGOSCOPY, GASTROSCOPY, DUODENOSCOPY (EGD), COMBINED N/A 12/24/2019    Procedure: ESOPHAGOGASTRODUODENOSCOPY (EGD);  Surgeon: Moisés Blandon DO;  Location:  GI     HC REVISE MEDIAN N/CARPAL TUNNEL SURG  1979    Bilateral     HC VASECTOMY UNILAT/BILAT W POSTOP SEMEN  1996    Vasectomy     HERNIORRHAPHY INGUINAL Right 9/4/2015    Procedure: HERNIORRHAPHY INGUINAL;  Surgeon: Domingo Ramsay MD;   Location: [facility]     SIGMOIDOSCOPY,BIOPSY   or so    reported as normal- [facility]     Allergies:  Allergies as of 2020 - Reviewed 2020   Allergen Reaction Noted     No known drug allergies  2005     Current Medications:  Current Outpatient Medications   Medication Sig Dispense Refill     aspirin 81 MG tablet Take 81 mg by mouth daily       CALCIUM 500 MG PO TABS Take by mouth 2 times daily        GARLIC 2 times daily        HEMP OIL OR EXTRACT OR OTHER CBD CANNABINOID, NOT MEDICAL CANNABIS, At Bedtime       MAGNESIUM PO Take by mouth 2 times daily       VITAMIN C-VITAMIN D-ZINC PO        VITAMIN E PO Take by mouth 2 times daily        Family History:  Family History   Problem Relation Age of Onset     Respiratory Mother         Emphysema- she was a smoker,  at 79 yo     Hypertension Sister      Hypertension Brother      Lipids Brother      Unknown/Adopted Father      Hypertension Brother      Lipids Brother      Hypertension Sister      Asthma No family hx of      C.A.D. No family hx of      Diabetes No family hx of      Cerebrovascular Disease No family hx of      Breast Cancer No family hx of      Cancer - colorectal No family hx of      Prostate Cancer No family hx of      Cancer No family hx of      Cardiovascular No family hx of      Blood Disease No family hx of      Arthritis No family hx of      Alzheimer Disease No family hx of      Circulatory No family hx of      Alcohol/Drug No family hx of      Eye Disorder No family hx of      Depression No family hx of      Gastrointestinal Disease No family hx of      Genitourinary Problems No family hx of      Heart Disease No family hx of      Musculoskeletal Disorder No family hx of      Neurologic Disorder No family hx of      Thyroid Disease No family hx of      No family history of bleeding or clotting disorder.  No history of cancers in the family.  Social History:  Social History     Socioeconomic History     Marital  status:      Spouse name: Not on file     Number of children: Not on file     Years of education: Not on file     Highest education level: Not on file   Occupational History     Not on file   Social Needs     Financial resource strain: Not on file     Food insecurity:     Worry: Not on file     Inability: Not on file     Transportation needs:     Medical: Not on file     Non-medical: Not on file   Tobacco Use     Smoking status: Former Smoker     Packs/day: 2.00     Years: 9.00     Pack years: 18.00     Types: Cigarettes     Start date: 1968     Last attempt to quit: 1977     Years since quittin.1     Smokeless tobacco: Never Used     Tobacco comment: from  until  - 1-2 packs per day    Substance and Sexual Activity     Alcohol use: Yes     Alcohol/week: 0.0 standard drinks     Comment: infrequently     Drug use: No     Sexual activity: Yes     Partners: Female   Lifestyle     Physical activity:     Days per week: Not on file     Minutes per session: Not on file     Stress: Not on file   Relationships     Social connections:     Talks on phone: Not on file     Gets together: Not on file     Attends Confucianist service: Not on file     Active member of club or organization: Not on file     Attends meetings of clubs or organizations: Not on file     Relationship status: Not on file     Intimate partner violence:     Fear of current or ex partner: Not on file     Emotionally abused: Not on file     Physically abused: Not on file     Forced sexual activity: Not on file   Other Topics Concern     Parent/sibling w/ CABG, MI or angioplasty before 65F 55M? Not Asked   Social History Narrative     Not on file   He used to smoke but quit at the age of 28.  He drinks alcohol very occasionally.  He lives with his wife.  He used to work for [employer] and retired in the spring 2019.  Physical Exam:  BP (!) 166/95 (BP Location: Right arm, Patient Position: Sitting, Cuff Size: Adult Regular)   Pulse 68    Temp 97.6  F (36.4  C) (Oral)   Resp 16   Wt 68 kg (150 lb)   SpO2 98%   BMI 23.21 kg/m      Wt Readings from Last 4 Encounters:   02/25/20 68 kg (150 lb)   01/28/20 68.4 kg (150 lb 12.8 oz)   01/15/20 66.9 kg (147 lb 6.4 oz)   12/17/19 64.7 kg (142 lb 9.6 oz)     CONSTITUTIONAL: No apparent distress  EYES: PERRLA, without pallor or jaundice  ENT/MOUTH: Ears unremarkable. No oral lesions  CVS: s1s2 normal  RESPIRATORY: Chest is clear  GI: Abdomen is benign  NEURO: Alert and oriented ×3  INTEGUMENT: no concerning skin rashes   LYMPHATIC: no palpable lymphadenopathy  MUSCULOSKELETAL: Unremarkable. No bony tenderness.   EXTREMITIES: no pedal edema  PSYCH: Mentation, mood and affect are appropriate        Laboratory/Imaging Studies      Reviewed    EXAMINATION: CT ABDOMEN PELVIS W CONTRAST, 12/13/2019 10:27 AM     TECHNIQUE:  Helical CT images from the lung bases through the  symphysis pubis were obtained with IV contrast. Contrast dose: 88 ml  isovue 370     COMPARISON: None     HISTORY: Weight loss, unintended, non-localized abd pain     FINDINGS:     Abdomen and pelvis: There is a 5 mm hypoattenuating lesion in the left  lobe of the liver, likely representing a simple hepatic cyst (3 series  2 image 13). The gallbladder, pancreas, spleen and adrenal glands are  unremarkable. The main portal is patent. No intrahepatic or extra  hepatic biliary ductal dilation. The right kidney demonstrates normal  nephrogenic enhancement without evidence of hydronephrosis, stone or  mass lesion. There is a 3 mm hypoattenuating lesion in the inferior  pole of the left kidney, too small to characterize (series 2 image  34). Left-sided peripelvic cysts. Heterogeneous enhancement in the  prostate peripheral zone. Mild cervical central bladder wall  thickening. No pelvic masses. The bowel is nondilated. Small calcified  diverticulum in the distal descending colon. Appendix is surgically  absent. Mild omental haziness without focal  nodularity.     Mild calcific atherosclerotic disease of the abdominal aorta which is  normal in caliber. Major abdominal vessels are patent. No  intra-abdominal, retroperitoneal or inguinal lymphadenopathy. No  pneumoperitoneum or free fluid.     Lung bases: The heart is not enlarged. No pericardial effusion. 3 mm  solid right lower lobe pulmonary nodule (series 3 image 3) and a 3 mm  solid left lower lobe pulmonary nodule (series 3 image 20).     Bones and soft tissues: Mild degenerative changes of the spine. No  suspicious osseous lesions.                                                                      IMPRESSION:   1. No definite finding to explain the patient's unintended weight  loss.  2. Nonspecific mild omental haziness. No focal nodularity.  3. Heterogeneously enhancing prostate peripheral zone is of uncertain  clinical significance. Correlate with patient's symptoms and PSA.  4. Sub-6 mm solid pulmonary nodules. Recommend follow-up per  Fleischner Society criteria guidelines.    CT CHEST WITH CONTRAST 12/19/2019 12:39 PM      HISTORY: Lung nodule (Pulmonary nodule). Pulmonary nodules. Unintended  weight loss.     COMPARISON: Abdomen and pelvis CT from December 13, 2019.     TECHNIQUE: Volumetric helical acquisition of CT images of the chest  from the clavicles to the kidneys were acquired after the  administration of 75 mL, Isovue 370 IV contrast. Radiation dose for  this scan was reduced using automated exposure control, adjustment of  the mA and/or kV according to patient size, or iterative  reconstruction technique.     FINDINGS:  A few scattered sub-6 mm nodules are again noted at the  lung bases, a few in the upper lobes. These are peripheral and  generally not a bronchovascular distribution. No pleural or  pericardial effusion. Normal caliber aorta. Normal heart size.  Unremarkable thyroid. No adenopathy in the chest. No acute findings in  the visualized upper abdomen.                                                                       IMPRESSION: A few sub-6 mm nodules are noted bilaterally which are  nonspecific.     Recommendations for one or multiple incidental lung nodules < 6mm :    Low risk patients: No routine follow-up.    High risk patients: Optional follow-up CT at 12 months; if  unchanged, no further follow-up.     *Low Risk: Minimal or absent history of smoking or other known risk  factors.  *Nonsolid (ground glass) or partly solid nodules may require longer  follow-up to exclude indolent adenocarcinoma.  *Recommendations based on Guidelines for the Management of Incidental  Pulmonary Nodules Detected at CT: From the Fleischner Society 2017,  Radiology 2017.       ASSESSMENT/PLAN:    Thrombocytopenia.  He has isolated thrombocytopenia at least since 2015.  Extensive testing has not revealed any particular cause.    He has evidence of prior exposure to hepatitis B and hepatitis C although he has evidence of immunity against hepatitis B and hepatitis C RNA is also undetectable.    His platelet count is 90 today.    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        We discussed that ITP is a diagnosis of exclusion  For ITP, the goal of therapy is to prevent bleeding rather than to normalize the platelet count.  In the absence of bleeding, therapy to raise the platelet count is generally indicated for those with a platelet count <30,000. Therapy may also be appropriate at a higher platelet count if the patient has a history of bleeding at a higher count, or if there are other factors that increase the risk of bleeding which is not the case in him.  When therapy is indicated, systemic steroids are usually the first line of therapy. IVIG may also be appropriate, especially if there is a need to raise the platelet count more rapidly although the increase in platelets is usually temporary.     At this time, I would recommend to continue serial monitoring of the platelets every month.  If there is indication to treat, then  I will start Dexamethasone 40 mg x 4 days.    Weight loss/night sweats.  He had significant weight loss over several months last year but this was in the setting when he was very busy with household work and was not eating well and had problems with the teeth.  He also had night sweats at that time.  He has not had night sweats over the last several months and he has gained some weight and lately it has a stabilized.  Recent testing with CT chest abdomen pelvis did not show any pathology which is contributing to the weight loss.  He did not have any abnormal lymphadenopathy, hepatosplenomegaly.  Tiny nonspecific lung nodules were found and nonspecific omental haziness was found.  As his symptoms of weight loss and night sweats have resolved, it is reasonable to observe this for now.    Hepatitis B/C.  He has evidence of prior infection to hepatitis B as hepatitis B surface antibody as well as hepatitis B core antibody are positive but hepatitis B surface antigen is negative.  Similarly hepatitis C antibody is positive but hepatitis C virus RNA is negative.  He does not have any evidence of liver damage.  I advised him to limit alcohol use and I also advised him to follow with primary care physician.  He drinks alcohol very occasionally.    Elevated blood pressure.  His blood pressure in the clinic was elevated.  He is asymptomatic.  I recommend that he checks his blood pressures at home regularly when he is relaxed and keep a log of it.  If it is persistently high then he should talk to his primary care physician for better blood pressure management.     Lung nodules- tiny non specific lung nodules- follow with PCP/Lung nodule clinic.    Return to clinic in 3 months.  He will get CBC/differential checks every month.    All questions answered.  He is agreeable and comfortable with the plan.    Belen Powell MD              Again, thank you for allowing me to participate in the care of your patient.         Sincerely,        Belen Powell MD

## 2020-02-25 NOTE — NURSING NOTE
"Oncology Rooming Note    February 25, 2020 8:47 AM   Duane Demann is a 69 year old male who presents for:    Chief Complaint   Patient presents with     Oncology Clinic Visit     follow up     Initial Vitals: BP (!) 166/95 (BP Location: Right arm, Patient Position: Sitting, Cuff Size: Adult Regular)   Pulse 68   Temp 97.6  F (36.4  C) (Oral)   Resp 16   Wt 68 kg (150 lb)   SpO2 98%   BMI 23.21 kg/m   Estimated body mass index is 23.21 kg/m  as calculated from the following:    Height as of 1/28/20: 1.712 m (5' 7.4\").    Weight as of this encounter: 68 kg (150 lb). Body surface area is 1.8 meters squared.  No Pain (0) Comment: Data Unavailable   No LMP for male patient.  Allergies reviewed: Yes  Medications reviewed: Yes    Medications: Medication refills not needed today.  Pharmacy name entered into EPIC: [id number] - [place], [place] - [address] [facility]      Edith Medrano RN                            "

## 2020-02-25 NOTE — PROGRESS NOTES
Hematology follow up visit:  Date on this visit: 2/25/2020     Duane Demann  is referred by Dr.Cassondra ОЛЕГ Fortune for a hematology consultation. He requires evaluation for new diagnosis of thrombocytopenia.    Primary Physician: Tiffanie Gupta     History Of Present Illness:    Please see my previous note for details.  I have copied and updated from prior note.  Mr. Copeland is a 69 year old male who I evaluated in January 2020 for thrombocytopenia.  He tells me that around the spring time in 2019 he was very busy with his housework and was also not eating well and also had some problems with the teeth and he lost quite a bit of her weight over several months and lost about 35 pounds.  Then he started to eat better and has gained back about 10 pounds over the last couple of months.  During this time he also was noticing drenching night sweats but these have also resolved over the last month.  He otherwise feels well and has good energy and remains fully functional.  At times he feels anxious and does not have the motivation to do things but overall is doing well.  Denies new swellings.  No issues with bleeding.  No shortness of breath.  No nausea vomiting diarrhea constipation.  No neuropathy or new skin problems.  Denies any recent change in medications.  He only started taking hemp oil about 3 weeks ago as he was having trouble sleeping.  He takes his multivitamins.  During this time he had work-up done which showed that his platelet count was mildly low.  CT chest abdomen and pelvis in December 2019 showed tiny scattered pulmonary nodules which were indeterminate.  Normal liver and spleen size.  No lymphadenopathy.  Nonspecific haziness of the omentum was seen.  No pathology was identified which could have attributed to his weight loss.  Vitamin B12 and folate levels were normal.  Hepatitis C antibody was positive but hepatitis C virus RNA was undetectable.  Other than mild thrombocytopenia, rest of  the CBC was unremarkable.  CMP was also unremarkable.  I did further work-up and peripheral blood smear was essentially unremarkable apart from thrombocytopenia.  He was noted to have immunity from prior hepatitis B exposure as hepatitis B surface antibody and hepatitis B core antibody were positive while hepatitis B surface antigen was negative.  HIV was negative.      He comes in today and tells me overall he has been doing well.  He is now eating and drinking well and his weight has stabilized.  He denies any more night sweats.  No new swellings.  No issues with bleeding.  No shortness of breath.  Denies any infections.  Denies any GI problems.  Energy is good.        ROS:  A comprehensive ROS was otherwise neg.    I reviewed other history in epic as below.    Past Medical/Surgical History:  Past Medical History:   Diagnosis Date     History of tobacco use 1968 - 1980    about 1 ppd     Past Surgical History:   Procedure Laterality Date     C APPENDECTOMY  1968     C STRESS ECHO TEST NL  2005    [facility]     COLONOSCOPY  1/31/2011    COLONOSCOPY performed by TEDDY GARCIA at  GI     COLONOSCOPY N/A 4/3/2017    Procedure: COMBINED COLONOSCOPY, SINGLE OR MULTIPLE BIOPSY/POLYPECTOMY BY BIOPSY;  Surgeon: Issac Barrera MD;  Location:  GI     ESOPHAGOSCOPY, GASTROSCOPY, DUODENOSCOPY (EGD), COMBINED N/A 12/24/2019    Procedure: ESOPHAGOGASTRODUODENOSCOPY (EGD);  Surgeon: Moisés Blandon DO;  Location:  GI     HC REVISE MEDIAN N/CARPAL TUNNEL SURG  1979    Bilateral     HC VASECTOMY UNILAT/BILAT W POSTOP SEMEN  1996    Vasectomy     HERNIORRHAPHY INGUINAL Right 9/4/2015    Procedure: HERNIORRHAPHY INGUINAL;  Surgeon: Domingo Ramsay MD;  Location:  OR     SIGMOIDOSCOPY,BIOPSY  2004 or so    reported as normal- [facility]     Allergies:  Allergies as of 02/25/2020 - Reviewed 02/25/2020   Allergen Reaction Noted     No known drug allergies  05/31/2005     Current  Medications:  Current Outpatient Medications   Medication Sig Dispense Refill     aspirin 81 MG tablet Take 81 mg by mouth daily       CALCIUM 500 MG PO TABS Take by mouth 2 times daily        GARLIC 2 times daily        HEMP OIL OR EXTRACT OR OTHER CBD CANNABINOID, NOT MEDICAL CANNABIS, At Bedtime       MAGNESIUM PO Take by mouth 2 times daily       VITAMIN C-VITAMIN D-ZINC PO        VITAMIN E PO Take by mouth 2 times daily        Family History:  Family History   Problem Relation Age of Onset     Respiratory Mother         Emphysema- she was a smoker,  at 79 yo     Hypertension Sister      Hypertension Brother      Lipids Brother      Unknown/Adopted Father      Hypertension Brother      Lipids Brother      Hypertension Sister      Asthma No family hx of      C.A.D. No family hx of      Diabetes No family hx of      Cerebrovascular Disease No family hx of      Breast Cancer No family hx of      Cancer - colorectal No family hx of      Prostate Cancer No family hx of      Cancer No family hx of      Cardiovascular No family hx of      Blood Disease No family hx of      Arthritis No family hx of      Alzheimer Disease No family hx of      Circulatory No family hx of      Alcohol/Drug No family hx of      Eye Disorder No family hx of      Depression No family hx of      Gastrointestinal Disease No family hx of      Genitourinary Problems No family hx of      Heart Disease No family hx of      Musculoskeletal Disorder No family hx of      Neurologic Disorder No family hx of      Thyroid Disease No family hx of      No family history of bleeding or clotting disorder.  No history of cancers in the family.  Social History:  Social History     Socioeconomic History     Marital status:      Spouse name: Not on file     Number of children: Not on file     Years of education: Not on file     Highest education level: Not on file   Occupational History     Not on file   Social Needs     Financial resource strain:  Not on file     Food insecurity:     Worry: Not on file     Inability: Not on file     Transportation needs:     Medical: Not on file     Non-medical: Not on file   Tobacco Use     Smoking status: Former Smoker     Packs/day: 2.00     Years: 9.00     Pack years: 18.00     Types: Cigarettes     Start date: 1968     Last attempt to quit: 1977     Years since quittin.1     Smokeless tobacco: Never Used     Tobacco comment: from  until  - 1-2 packs per day    Substance and Sexual Activity     Alcohol use: Yes     Alcohol/week: 0.0 standard drinks     Comment: infrequently     Drug use: No     Sexual activity: Yes     Partners: Female   Lifestyle     Physical activity:     Days per week: Not on file     Minutes per session: Not on file     Stress: Not on file   Relationships     Social connections:     Talks on phone: Not on file     Gets together: Not on file     Attends Confucianism service: Not on file     Active member of club or organization: Not on file     Attends meetings of clubs or organizations: Not on file     Relationship status: Not on file     Intimate partner violence:     Fear of current or ex partner: Not on file     Emotionally abused: Not on file     Physically abused: Not on file     Forced sexual activity: Not on file   Other Topics Concern     Parent/sibling w/ CABG, MI or angioplasty before 65F 55M? Not Asked   Social History Narrative     Not on file   He used to smoke but quit at the age of 28.  He drinks alcohol very occasionally.  He lives with his wife.  He used to work for [employer] and retired in the spring 2019.  Physical Exam:  BP (!) 166/95 (BP Location: Right arm, Patient Position: Sitting, Cuff Size: Adult Regular)   Pulse 68   Temp 97.6  F (36.4  C) (Oral)   Resp 16   Wt 68 kg (150 lb)   SpO2 98%   BMI 23.21 kg/m     Wt Readings from Last 4 Encounters:   20 68 kg (150 lb)   20 68.4 kg (150 lb 12.8 oz)   01/15/20 66.9 kg (147 lb 6.4 oz)   19  64.7 kg (142 lb 9.6 oz)     CONSTITUTIONAL: No apparent distress  EYES: PERRLA, without pallor or jaundice  ENT/MOUTH: Ears unremarkable. No oral lesions  CVS: s1s2 normal  RESPIRATORY: Chest is clear  GI: Abdomen is benign  NEURO: Alert and oriented ×3  INTEGUMENT: no concerning skin rashes   LYMPHATIC: no palpable lymphadenopathy  MUSCULOSKELETAL: Unremarkable. No bony tenderness.   EXTREMITIES: no pedal edema  PSYCH: Mentation, mood and affect are appropriate        Laboratory/Imaging Studies      Reviewed    EXAMINATION: CT ABDOMEN PELVIS W CONTRAST, 12/13/2019 10:27 AM     TECHNIQUE:  Helical CT images from the lung bases through the  symphysis pubis were obtained with IV contrast. Contrast dose: 88 ml  isovue 370     COMPARISON: None     HISTORY: Weight loss, unintended, non-localized abd pain     FINDINGS:     Abdomen and pelvis: There is a 5 mm hypoattenuating lesion in the left  lobe of the liver, likely representing a simple hepatic cyst (3 series  2 image 13). The gallbladder, pancreas, spleen and adrenal glands are  unremarkable. The main portal is patent. No intrahepatic or extra  hepatic biliary ductal dilation. The right kidney demonstrates normal  nephrogenic enhancement without evidence of hydronephrosis, stone or  mass lesion. There is a 3 mm hypoattenuating lesion in the inferior  pole of the left kidney, too small to characterize (series 2 image  34). Left-sided peripelvic cysts. Heterogeneous enhancement in the  prostate peripheral zone. Mild cervical central bladder wall  thickening. No pelvic masses. The bowel is nondilated. Small calcified  diverticulum in the distal descending colon. Appendix is surgically  absent. Mild omental haziness without focal nodularity.     Mild calcific atherosclerotic disease of the abdominal aorta which is  normal in caliber. Major abdominal vessels are patent. No  intra-abdominal, retroperitoneal or inguinal lymphadenopathy. No  pneumoperitoneum or free  fluid.     Lung bases: The heart is not enlarged. No pericardial effusion. 3 mm  solid right lower lobe pulmonary nodule (series 3 image 3) and a 3 mm  solid left lower lobe pulmonary nodule (series 3 image 20).     Bones and soft tissues: Mild degenerative changes of the spine. No  suspicious osseous lesions.                                                                      IMPRESSION:   1. No definite finding to explain the patient's unintended weight  loss.  2. Nonspecific mild omental haziness. No focal nodularity.  3. Heterogeneously enhancing prostate peripheral zone is of uncertain  clinical significance. Correlate with patient's symptoms and PSA.  4. Sub-6 mm solid pulmonary nodules. Recommend follow-up per  Fleischner Society criteria guidelines.    CT CHEST WITH CONTRAST 12/19/2019 12:39 PM      HISTORY: Lung nodule (Pulmonary nodule). Pulmonary nodules. Unintended  weight loss.     COMPARISON: Abdomen and pelvis CT from December 13, 2019.     TECHNIQUE: Volumetric helical acquisition of CT images of the chest  from the clavicles to the kidneys were acquired after the  administration of 75 mL, Isovue 370 IV contrast. Radiation dose for  this scan was reduced using automated exposure control, adjustment of  the mA and/or kV according to patient size, or iterative  reconstruction technique.     FINDINGS:  A few scattered sub-6 mm nodules are again noted at the  lung bases, a few in the upper lobes. These are peripheral and  generally not a bronchovascular distribution. No pleural or  pericardial effusion. Normal caliber aorta. Normal heart size.  Unremarkable thyroid. No adenopathy in the chest. No acute findings in  the visualized upper abdomen.                                                                      IMPRESSION: A few sub-6 mm nodules are noted bilaterally which are  nonspecific.     Recommendations for one or multiple incidental lung nodules < 6mm :    Low risk patients: No routine  follow-up.    High risk patients: Optional follow-up CT at 12 months; if  unchanged, no further follow-up.     *Low Risk: Minimal or absent history of smoking or other known risk  factors.  *Nonsolid (ground glass) or partly solid nodules may require longer  follow-up to exclude indolent adenocarcinoma.  *Recommendations based on Guidelines for the Management of Incidental  Pulmonary Nodules Detected at CT: From the Fleischner Society 2017,  Radiology 2017.       ASSESSMENT/PLAN:    Thrombocytopenia.  He has isolated thrombocytopenia at least since 2015.  Extensive testing has not revealed any particular cause.    He has evidence of prior exposure to hepatitis B and hepatitis C although he has evidence of immunity against hepatitis B and hepatitis C RNA is also undetectable.    His platelet count is 90 today.    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        We discussed that ITP is a diagnosis of exclusion  For ITP, the goal of therapy is to prevent bleeding rather than to normalize the platelet count.  In the absence of bleeding, therapy to raise the platelet count is generally indicated for those with a platelet count <30,000. Therapy may also be appropriate at a higher platelet count if the patient has a history of bleeding at a higher count, or if there are other factors that increase the risk of bleeding which is not the case in him.  When therapy is indicated, systemic steroids are usually the first line of therapy. IVIG may also be appropriate, especially if there is a need to raise the platelet count more rapidly although the increase in platelets is usually temporary.     At this time, I would recommend to continue serial monitoring of the platelets every month.  If there is indication to treat, then I will start Dexamethasone 40 mg x 4 days.    Weight loss/night sweats.  He had significant weight loss over several months last year but this was in the setting when he was very busy with household work and was not eating well  and had problems with the teeth.  He also had night sweats at that time.  He has not had night sweats over the last several months and he has gained some weight and lately it has a stabilized.  Recent testing with CT chest abdomen pelvis did not show any pathology which is contributing to the weight loss.  He did not have any abnormal lymphadenopathy, hepatosplenomegaly.  Tiny nonspecific lung nodules were found and nonspecific omental haziness was found.  As his symptoms of weight loss and night sweats have resolved, it is reasonable to observe this for now.    Hepatitis B/C.  He has evidence of prior infection to hepatitis B as hepatitis B surface antibody as well as hepatitis B core antibody are positive but hepatitis B surface antigen is negative.  Similarly hepatitis C antibody is positive but hepatitis C virus RNA is negative.  He does not have any evidence of liver damage.  I advised him to limit alcohol use and I also advised him to follow with primary care physician.  He drinks alcohol very occasionally.    Elevated blood pressure.  His blood pressure in the clinic was elevated.  He is asymptomatic.  I recommend that he checks his blood pressures at home regularly when he is relaxed and keep a log of it.  If it is persistently high then he should talk to his primary care physician for better blood pressure management.     Lung nodules- tiny non specific lung nodules- follow with PCP/Lung nodule clinic.    Return to clinic in 3 months.  He will get CBC/differential checks every month.    All questions answered.  He is agreeable and comfortable with the plan.    Belen Powell MD

## 2020-03-24 DIAGNOSIS — D69.6 THROMBOCYTOPENIA (H): ICD-10-CM

## 2020-03-24 LAB
BASOPHILS # BLD AUTO: 0 10E9/L (ref 0–0.2)
BASOPHILS NFR BLD AUTO: 0.5 %
DIFFERENTIAL METHOD BLD: ABNORMAL
EOSINOPHIL # BLD AUTO: 0.4 10E9/L (ref 0–0.7)
EOSINOPHIL NFR BLD AUTO: 6.4 %
ERYTHROCYTE [DISTWIDTH] IN BLOOD BY AUTOMATED COUNT: 13.1 % (ref 10–15)
HCT VFR BLD AUTO: 44.8 % (ref 40–53)
HGB BLD-MCNC: 15 G/DL (ref 13.3–17.7)
LYMPHOCYTES # BLD AUTO: 2 10E9/L (ref 0.8–5.3)
LYMPHOCYTES NFR BLD AUTO: 35.1 %
MCH RBC QN AUTO: 29.8 PG (ref 26.5–33)
MCHC RBC AUTO-ENTMCNC: 33.5 G/DL (ref 31.5–36.5)
MCV RBC AUTO: 89 FL (ref 78–100)
MONOCYTES # BLD AUTO: 0.7 10E9/L (ref 0–1.3)
MONOCYTES NFR BLD AUTO: 11.6 %
NEUTROPHILS # BLD AUTO: 2.6 10E9/L (ref 1.6–8.3)
NEUTROPHILS NFR BLD AUTO: 46.4 %
PLATELET # BLD AUTO: 130 10E9/L (ref 150–450)
RBC # BLD AUTO: 5.03 10E12/L (ref 4.4–5.9)
WBC # BLD AUTO: 5.6 10E9/L (ref 4–11)

## 2020-03-24 PROCEDURE — 36415 COLL VENOUS BLD VENIPUNCTURE: CPT | Performed by: INTERNAL MEDICINE

## 2020-03-24 PROCEDURE — 85025 COMPLETE CBC W/AUTO DIFF WBC: CPT | Performed by: INTERNAL MEDICINE

## 2020-05-19 DIAGNOSIS — D69.6 THROMBOCYTOPENIA (H): ICD-10-CM

## 2020-05-19 LAB
BASOPHILS # BLD AUTO: 0 10E9/L (ref 0–0.2)
BASOPHILS NFR BLD AUTO: 0.6 %
DIFFERENTIAL METHOD BLD: ABNORMAL
EOSINOPHIL # BLD AUTO: 0.3 10E9/L (ref 0–0.7)
EOSINOPHIL NFR BLD AUTO: 5.6 %
ERYTHROCYTE [DISTWIDTH] IN BLOOD BY AUTOMATED COUNT: 12.7 % (ref 10–15)
HCT VFR BLD AUTO: 44 % (ref 40–53)
HGB BLD-MCNC: 14.9 G/DL (ref 13.3–17.7)
LYMPHOCYTES # BLD AUTO: 2 10E9/L (ref 0.8–5.3)
LYMPHOCYTES NFR BLD AUTO: 36.2 %
MCH RBC QN AUTO: 29.9 PG (ref 26.5–33)
MCHC RBC AUTO-ENTMCNC: 33.9 G/DL (ref 31.5–36.5)
MCV RBC AUTO: 88 FL (ref 78–100)
MONOCYTES # BLD AUTO: 0.5 10E9/L (ref 0–1.3)
MONOCYTES NFR BLD AUTO: 9.1 %
NEUTROPHILS # BLD AUTO: 2.6 10E9/L (ref 1.6–8.3)
NEUTROPHILS NFR BLD AUTO: 48.5 %
PLATELET # BLD AUTO: 106 10E9/L (ref 150–450)
RBC # BLD AUTO: 4.99 10E12/L (ref 4.4–5.9)
WBC # BLD AUTO: 5.4 10E9/L (ref 4–11)

## 2020-05-19 PROCEDURE — 85025 COMPLETE CBC W/AUTO DIFF WBC: CPT | Performed by: INTERNAL MEDICINE

## 2020-05-19 PROCEDURE — 36415 COLL VENOUS BLD VENIPUNCTURE: CPT | Performed by: INTERNAL MEDICINE

## 2020-09-29 DIAGNOSIS — D69.6 THROMBOCYTOPENIA (H): ICD-10-CM

## 2020-09-29 LAB
BASOPHILS # BLD AUTO: 0 10E9/L (ref 0–0.2)
BASOPHILS NFR BLD AUTO: 0.3 %
DIFFERENTIAL METHOD BLD: ABNORMAL
EOSINOPHIL # BLD AUTO: 0.2 10E9/L (ref 0–0.7)
EOSINOPHIL NFR BLD AUTO: 3.6 %
ERYTHROCYTE [DISTWIDTH] IN BLOOD BY AUTOMATED COUNT: 13 % (ref 10–15)
HCT VFR BLD AUTO: 44.1 % (ref 40–53)
HGB BLD-MCNC: 15.1 G/DL (ref 13.3–17.7)
LYMPHOCYTES # BLD AUTO: 1.2 10E9/L (ref 0.8–5.3)
LYMPHOCYTES NFR BLD AUTO: 20.9 %
MCH RBC QN AUTO: 30.6 PG (ref 26.5–33)
MCHC RBC AUTO-ENTMCNC: 34.2 G/DL (ref 31.5–36.5)
MCV RBC AUTO: 90 FL (ref 78–100)
MONOCYTES # BLD AUTO: 0.6 10E9/L (ref 0–1.3)
MONOCYTES NFR BLD AUTO: 10.1 %
NEUTROPHILS # BLD AUTO: 3.8 10E9/L (ref 1.6–8.3)
NEUTROPHILS NFR BLD AUTO: 65.1 %
PLATELET # BLD AUTO: 117 10E9/L (ref 150–450)
RBC # BLD AUTO: 4.93 10E12/L (ref 4.4–5.9)
WBC # BLD AUTO: 5.9 10E9/L (ref 4–11)

## 2020-09-29 PROCEDURE — 36415 COLL VENOUS BLD VENIPUNCTURE: CPT | Performed by: INTERNAL MEDICINE

## 2020-09-29 PROCEDURE — 85025 COMPLETE CBC W/AUTO DIFF WBC: CPT | Performed by: INTERNAL MEDICINE

## 2021-01-09 ENCOUNTER — HEALTH MAINTENANCE LETTER (OUTPATIENT)
Age: 71
End: 2021-01-09

## 2021-01-20 ENCOUNTER — NURSE TRIAGE (OUTPATIENT)
Dept: NURSING | Facility: CLINIC | Age: 71
End: 2021-01-20

## 2021-01-20 DIAGNOSIS — Z11.59 SCREENING FOR VIRAL DISEASE: ICD-10-CM

## 2021-01-20 DIAGNOSIS — Z11.59 SCREENING FOR VIRAL DISEASE: Primary | ICD-10-CM

## 2021-01-20 PROCEDURE — 86769 SARS-COV-2 COVID-19 ANTIBODY: CPT | Performed by: FAMILY MEDICINE

## 2021-01-20 PROCEDURE — 36415 COLL VENOUS BLD VENIPUNCTURE: CPT | Performed by: FAMILY MEDICINE

## 2021-01-20 NOTE — TELEPHONE ENCOUNTER
"Patient is calling requesting COVID serologic antibody testing.  NOTE: Serologic testing is a blood test for 'antibodies' which are made at 10-14 days after you have had symptoms of COVID or were exposed and had an asymptomatic infection.  This does NOT test you for 'active' infection or tell you if you are contagious.    Are you a healthcare worker? no  Do you currently have a cough, fever, body aches, shortness of breath, or difficulty breathing?  No  Did you previously have cough, fever, body aches, shortness of breath, or difficulty breathing that have now resolved? Has had previous covid symptoms.   Symptoms began 150 days ago.  Symptoms started > 14 days ago. Lab order placed per SARS-CoV-2 Serology test Standing Order using indication \"Previously symptomatic >14d since onset, currently asymptomatic\" and diagnosis code \"Screening for viral disease\" (Z11.59)      The patient was informed: \"Testing is limited each day and it may take time for testing to be available to everyone who has called. You will receive a call within 48-72 hours to schedule the serology testing. Please confirm the best number to reach you is [phone]. If you have any questions about scheduling, call [phone].\"       "

## 2021-01-21 LAB
SARS-COV-2 AB PNL SERPL IA: NEGATIVE
SARS-COV-2 IGG SERPL IA-ACNC: NORMAL

## 2021-08-03 NOTE — PROGRESS NOTES
"SUBJECTIVE:   Duane E Demann is a 70 year old male who presents for Preventive Visit.      Patient has been advised of split billing requirements and indicates understanding: Yes   Are you in the first 12 months of your Medicare coverage?  No    Healthy Habits:     In general, how would you rate your overall health?  Good    Frequency of exercise:  4-5 days/week    Duration of exercise:  15-30 minutes    Do you usually eat at least 4 servings of fruit and vegetables a day, include whole grains    & fiber and avoid regularly eating high fat or \"junk\" foods?  No    Taking medications regularly:  Yes    Medication side effects:  Not applicable    Ability to successfully perform activities of daily living:  No assistance needed    Home Safety:  No safety concerns identified    Hearing Impairment:  Difficulty following a conversation in a noisy restaurant or crowded room    In the past 6 months, have you been bothered by leaking of urine?  No    In general, how would you rate your overall mental or emotional health?  Good      PHQ-2 Total Score: 1    Additional concerns today:  No    Do you feel safe in your environment? Yes    Have you ever done Advance Care Planning? (For example, a Health Directive, POLST, or a discussion with a medical provider or your loved ones about your wishes): No, advance care planning information given to patient to review.  Patient plans to discuss their wishes with loved ones or provider.          Right Ear:      1000 Hz RESPONSE- on Level: 40 db (Conditioning sound)   1000 Hz: RESPONSE- on Level:   20 db    2000 Hz: RESPONSE- on Level:   20 db    4000 Hz: RESPONSE- on Level:   20 db     Left Ear:      4000 Hz: RESPONSE- on Level:   20 db    2000 Hz: RESPONSE- on Level:   20 db    1000 Hz: RESPONSE- on Level:   20 db     500 Hz: RESPONSE- on Level: 25 db    Right Ear:    500 Hz: RESPONSE- on Level: 25 db    Hearing Acuity: Pass    Hearing Assessment: normal   Fall risk  Fallen 2 or more " times in the past year?: (P) No  Any fall with injury in the past year?: (P) No    Cognitive Screening   1) Repeat 3 items (Leader, Season, Table)    2) Clock draw:   NORMAL  3) 3 item recall: Recalls 3 objects  Results: 3 items recalled: COGNITIVE IMPAIRMENT LESS LIKELY    Mini-CogTM Copyright S Arianna. Licensed by the author for use in [facility]; reprinted with permission ([email or web address]). All rights reserved.      Do you have sleep apnea, excessive snoring or daytime drowsiness?: no    Reviewed and updated as needed this visit by clinical staff  Tobacco  Allergies  Meds  Problems  Med Hx  Surg Hx  Fam Hx  Soc Hx          Reviewed and updated as needed this visit by Provider  Tobacco  Allergies  Meds  Problems  Med Hx  Surg Hx  Fam Hx           Social History     Tobacco Use     Smoking status: Former Smoker     Packs/day: 2.00     Years: 9.00     Pack years: 18.00     Types: Cigarettes     Start date: 1968     Quit date: 1977     Years since quittin.6     Smokeless tobacco: Never Used     Tobacco comment: from  until  - 1-2 packs per day    Substance Use Topics     Alcohol use: Yes     Alcohol/week: 0.0 standard drinks     Comment: infrequently       Alcohol Use 2021   Prescreen: >3 drinks/day or >7 drinks/week? No   Prescreen: >3 drinks/day or >7 drinks/week? -         Current providers sharing in care for this patient include:   Patient Care Team:  Tiffanie Gupta APRN CNP as PCP - General (Nurse Practitioner - Family)  Miriam Eng PA-C as Assigned PCP  Belen Powell MD as Assigned Cancer Care Provider    The following health maintenance items are reviewed in Epic and correct as of today:  Health Maintenance Due   Topic Date Due     COVID-19 Vaccine (1) Never done     ZOSTER IMMUNIZATION (1 of 2) Never done     Lab work is in process  Labs reviewed in EPIC  BP Readings from Last 3 Encounters:   21 130/78   20 (!)  "166/95   01/28/20 (!) 178/98    Wt Readings from Last 3 Encounters:   08/05/21 65.4 kg (144 lb 3.2 oz)   02/25/20 68 kg (150 lb)   01/28/20 68.4 kg (150 lb 12.8 oz)               \Review of Systems   Constitutional: Negative for chills and fever.   HENT: Negative for congestion, ear pain, hearing loss and sore throat.    Eyes: Negative for pain and visual disturbance.   Respiratory: Negative for cough and shortness of breath.    Cardiovascular: Negative for chest pain, palpitations and peripheral edema.   Gastrointestinal: Negative for abdominal pain, constipation, diarrhea, heartburn, hematochezia and nausea.   Genitourinary: Positive for frequency, impotence and urgency. Negative for discharge, dysuria, genital sores and hematuria.   Musculoskeletal: Positive for arthralgias. Negative for joint swelling and myalgias.   Skin: Negative for rash.   Neurological: Negative for dizziness, weakness, headaches and paresthesias.   Psychiatric/Behavioral: Negative for mood changes. The patient is nervous/anxious.        OBJECTIVE:   /78   Pulse 78   Temp 97.7  F (36.5  C) (Temporal)   Resp 20   Ht 1.71 m (5' 7.32\")   Wt 65.4 kg (144 lb 3.2 oz)   SpO2 98%   BMI 22.37 kg/m   Estimated body mass index is 22.37 kg/m  as calculated from the following:    Height as of this encounter: 1.71 m (5' 7.32\").    Weight as of this encounter: 65.4 kg (144 lb 3.2 oz).  Physical Exam  Constitutional:       General: He is not in acute distress.     Appearance: He is well-developed. He is not diaphoretic.   HENT:      Right Ear: External ear normal.      Left Ear: External ear normal.      Nose: Nose normal.   Eyes:      General:         Right eye: No discharge.         Left eye: No discharge.      Pupils: Pupils are equal, round, and reactive to light.   Neck:      Thyroid: No thyromegaly.      Vascular: No JVD.      Trachea: No tracheal deviation.   Cardiovascular:      Rate and Rhythm: Normal rate and regular rhythm.      " Heart sounds: Normal heart sounds. No murmur heard.   No friction rub. No gallop.    Pulmonary:      Effort: Pulmonary effort is normal.      Breath sounds: Normal breath sounds. No stridor. No wheezing or rales.   Abdominal:      General: Bowel sounds are normal. There is no distension.      Palpations: Abdomen is soft. There is no mass.      Tenderness: There is no abdominal tenderness. There is no guarding or rebound.      Hernia: No hernia is present.   Musculoskeletal:         General: Normal range of motion.      Cervical back: Normal range of motion and neck supple.   Lymphadenopathy:      Cervical: No cervical adenopathy.   Skin:     General: Skin is warm.   Neurological:      Mental Status: He is alert and oriented to person, place, and time.   Psychiatric:         Behavior: Behavior normal.         Thought Content: Thought content normal.         Judgment: Judgment normal.        Diagnostics  Reviewed in Epic  See orders pending in Epic       ASSESSMENT / PLAN:       ICD-10-CM    1. Screening for prostate cancer  Z12.5 PSA, screen     PSA, screen   2. Routine history and physical examination of adult  Z00.00    3. Screening for diabetes mellitus  Z13.1 Glucose     Glucose   4. Screening for lipid disorders  Z13.220 Lipid panel reflex to direct LDL Fasting     Lipid panel reflex to direct LDL Fasting   5. Encounter for Medicare annual wellness exam  Z00.00    6. Thrombocytopenia (H)  D69.6 CBC with platelets and differential     CBC with platelets and differential     - Patient doing well   - Due for fasting lab update      Will get today, not fully fasting   - Results will be communicated to patient when available and appropriate medication changes made if necessary       Identified Health Risks:    The patient was counseled and encouraged to consider modifying their diet and eating habits. He was provided with information on recommended healthy diet options.  The patient was provided with written  "information regarding signs of hearing loss.        Patient has been advised of split billing requirements and indicates understanding: Yes  COUNSELING:  Reviewed preventive health counseling, as reflected in patient instructions  Special attention given to:       Regular exercise       Healthy diet/nutrition       Vision screening       Hearing screening       Bladder control       Fall risk prevention       Colon cancer screening       Prostate cancer screening    Estimated body mass index is 22.37 kg/m  as calculated from the following:    Height as of this encounter: 1.71 m (5' 7.32\").    Weight as of this encounter: 65.4 kg (144 lb 3.2 oz).    He reports that he quit smoking about 44 years ago. His smoking use included cigarettes. He started smoking about 53 years ago. He has a 18.00 pack-year smoking history. He has never used smokeless tobacco.      Appropriate preventive services were discussed with this patient, including applicable screening as appropriate for cardiovascular disease, diabetes, osteopenia/osteoporosis, and glaucoma.  As appropriate for age/gender, discussed screening for colorectal cancer, prostate cancer, breast cancer, and cervical cancer. Checklist reviewing preventive services available has been given to the patient.    Reviewed patients plan of care and provided an AVS. The Basic Care Plan (routine screening as documented in Health Maintenance) for Duane meets the Care Plan requirement. This Care Plan has been established and reviewed with the Patient.    Counseling Resources:  ATP IV Guidelines  Pooled Cohorts Equation Calculator  Breast Cancer Risk Calculator  Breast Cancer: Medication to Reduce Risk  FRAX Risk Assessment  ICSI Preventive Guidelines  Dietary Guidelines for Americans, 2010  USDA's MyPlate  ASA Prophylaxis  Lung CA Screening    Review of the result(s) of each unique test - See list      9/29/20 - CBC      12/17/19 - PSA      3/22/19 - Lipid, BMP   Diagnosis or " treatment significantly limited by social determinants of health - None    30 minutes spent on the date of the encounter doing chart review, history and exam, documentation and further activities as noted above    The patient indicates understanding of these issues and agrees with the plan.    Miriam Eng PA-C  [facility]

## 2021-08-04 ASSESSMENT — ENCOUNTER SYMPTOMS
HEADACHES: 0
WEAKNESS: 1
SHORTNESS OF BREATH: 0
DYSURIA: 0
ABDOMINAL PAIN: 0
ARTHRALGIAS: 1
CONSTIPATION: 0
SORE THROAT: 0
FEVER: 0
COUGH: 0
CHILLS: 1
PALPITATIONS: 0
EYE PAIN: 0
DIZZINESS: 1
NERVOUS/ANXIOUS: 1
HEMATOCHEZIA: 0
NAUSEA: 1
HEARTBURN: 0
MYALGIAS: 0
PARESTHESIAS: 0
JOINT SWELLING: 0
FREQUENCY: 1
HEMATURIA: 0
DIARRHEA: 0

## 2021-08-04 ASSESSMENT — ACTIVITIES OF DAILY LIVING (ADL): CURRENT_FUNCTION: NO ASSISTANCE NEEDED

## 2021-08-05 ENCOUNTER — OFFICE VISIT (OUTPATIENT)
Dept: FAMILY MEDICINE | Facility: OTHER | Age: 71
End: 2021-08-05
Payer: MEDICARE

## 2021-08-05 VITALS
WEIGHT: 144.2 LBS | RESPIRATION RATE: 20 BRPM | DIASTOLIC BLOOD PRESSURE: 78 MMHG | HEART RATE: 78 BPM | HEIGHT: 67 IN | BODY MASS INDEX: 22.63 KG/M2 | OXYGEN SATURATION: 98 % | SYSTOLIC BLOOD PRESSURE: 130 MMHG | TEMPERATURE: 97.7 F

## 2021-08-05 DIAGNOSIS — Z13.220 SCREENING FOR LIPID DISORDERS: ICD-10-CM

## 2021-08-05 DIAGNOSIS — Z13.1 SCREENING FOR DIABETES MELLITUS: ICD-10-CM

## 2021-08-05 DIAGNOSIS — D69.6 THROMBOCYTOPENIA (H): ICD-10-CM

## 2021-08-05 DIAGNOSIS — Z12.5 SCREENING FOR PROSTATE CANCER: Primary | ICD-10-CM

## 2021-08-05 DIAGNOSIS — Z00.00 ENCOUNTER FOR MEDICARE ANNUAL WELLNESS EXAM: ICD-10-CM

## 2021-08-05 DIAGNOSIS — Z00.00 ROUTINE HISTORY AND PHYSICAL EXAMINATION OF ADULT: ICD-10-CM

## 2021-08-05 LAB
BASOPHILS # BLD AUTO: 0 10E3/UL (ref 0–0.2)
BASOPHILS NFR BLD AUTO: 1 %
CHOLEST SERPL-MCNC: 229 MG/DL
EOSINOPHIL # BLD AUTO: 0.1 10E3/UL (ref 0–0.7)
EOSINOPHIL NFR BLD AUTO: 3 %
ERYTHROCYTE [DISTWIDTH] IN BLOOD BY AUTOMATED COUNT: 13.7 % (ref 10–15)
FASTING STATUS PATIENT QL REPORTED: YES
FASTING STATUS PATIENT QL REPORTED: YES
GLUCOSE BLD-MCNC: 104 MG/DL (ref 70–99)
HCT VFR BLD AUTO: 44.3 % (ref 40–53)
HDLC SERPL-MCNC: 94 MG/DL
HGB BLD-MCNC: 15 G/DL (ref 13.3–17.7)
IMM GRANULOCYTES # BLD: 0 10E3/UL
IMM GRANULOCYTES NFR BLD: 0 %
LDLC SERPL CALC-MCNC: 118 MG/DL
LYMPHOCYTES # BLD AUTO: 1.4 10E3/UL (ref 0.8–5.3)
LYMPHOCYTES NFR BLD AUTO: 32 %
MCH RBC QN AUTO: 31.1 PG (ref 26.5–33)
MCHC RBC AUTO-ENTMCNC: 33.9 G/DL (ref 31.5–36.5)
MCV RBC AUTO: 92 FL (ref 78–100)
MONOCYTES # BLD AUTO: 0.5 10E3/UL (ref 0–1.3)
MONOCYTES NFR BLD AUTO: 10 %
NEUTROPHILS # BLD AUTO: 2.3 10E3/UL (ref 1.6–8.3)
NEUTROPHILS NFR BLD AUTO: 54 %
NONHDLC SERPL-MCNC: 135 MG/DL
NRBC # BLD AUTO: 0 10E3/UL
NRBC BLD AUTO-RTO: 0 /100
PLATELET # BLD AUTO: 77 10E3/UL (ref 150–450)
PSA SERPL-MCNC: 2.22 UG/L (ref 0–4)
RBC # BLD AUTO: 4.83 10E6/UL (ref 4.4–5.9)
TRIGL SERPL-MCNC: 84 MG/DL
WBC # BLD AUTO: 4.4 10E3/UL (ref 4–11)

## 2021-08-05 PROCEDURE — 85025 COMPLETE CBC W/AUTO DIFF WBC: CPT | Performed by: PHYSICIAN ASSISTANT

## 2021-08-05 PROCEDURE — G0103 PSA SCREENING: HCPCS | Performed by: PHYSICIAN ASSISTANT

## 2021-08-05 PROCEDURE — 36415 COLL VENOUS BLD VENIPUNCTURE: CPT | Performed by: PHYSICIAN ASSISTANT

## 2021-08-05 PROCEDURE — 80061 LIPID PANEL: CPT | Performed by: PHYSICIAN ASSISTANT

## 2021-08-05 PROCEDURE — 82947 ASSAY GLUCOSE BLOOD QUANT: CPT | Performed by: PHYSICIAN ASSISTANT

## 2021-08-05 PROCEDURE — G0438 PPPS, INITIAL VISIT: HCPCS | Performed by: PHYSICIAN ASSISTANT

## 2021-08-05 ASSESSMENT — ENCOUNTER SYMPTOMS
SHORTNESS OF BREATH: 0
EYE PAIN: 0
HEARTBURN: 0
DIZZINESS: 0
NERVOUS/ANXIOUS: 1
HEMATOCHEZIA: 0
PALPITATIONS: 0
CONSTIPATION: 0
JOINT SWELLING: 0
MYALGIAS: 0
ARTHRALGIAS: 1
COUGH: 0
DIARRHEA: 0
SORE THROAT: 0
CHILLS: 0
FREQUENCY: 1
HEADACHES: 0
WEAKNESS: 0
DYSURIA: 0
ABDOMINAL PAIN: 0
NAUSEA: 0
FEVER: 0
PARESTHESIAS: 0
HEMATURIA: 0

## 2021-08-05 ASSESSMENT — PAIN SCALES - GENERAL: PAINLEVEL: NO PAIN (0)

## 2021-08-05 ASSESSMENT — ACTIVITIES OF DAILY LIVING (ADL): CURRENT_FUNCTION: NO ASSISTANCE NEEDED

## 2021-08-05 ASSESSMENT — MIFFLIN-ST. JEOR: SCORE: 1387.84

## 2021-08-05 NOTE — PATIENT INSTRUCTIONS
Patient Education   Personalized Prevention Plan  You are due for the preventive services outlined below.  Your care team is available to assist you in scheduling these services.  If you have already completed any of these items, please share that information with your care team to update in your medical record.  Health Maintenance Due   Topic Date Due     COVID-19 Vaccine (1) Never done     Zoster (Shingles) Vaccine (1 of 2) Never done     Discuss Advance Care Planning  03/17/2019       Understanding USDA MyPlate  The USDA has guidelines to help you make healthy food choices. These are called MyPlate. MyPlate shows the food groups that make up healthy meals using the image of a place setting. Before you eat, think about the healthiest choices for what to put on your plate or in your cup or bowl. To learn more about building a healthy plate, visit www.Mezzobitplate.gov.    The food groups    Fruits. Any fruit or 100% fruit juice counts as part of the Fruit Group. Fruits may be fresh, canned, frozen, or dried, and may be whole, cut-up, or pureed. Make 1/2 of your plate fruits and vegetables.    Vegetables. Any vegetable or 100% vegetable juice counts as a member of the Vegetable Group. Vegetables may be fresh, frozen, canned, or dried. They can be served raw or cooked and may be whole, cut-up, or mashed. Make 1/2 of your plate fruits and vegetables.    Grains. All foods made from grains are part of the Grains Group. These include wheat, rice, oats, cornmeal, and barley. Grains are often used to make foods such as bread, pasta, oatmeal, cereal, tortillas, and grits. Grains should be no more than 1/4 of your plate. At least half of your grains should be whole grains.    Protein. This group includes meat, poultry, seafood, beans and peas, eggs, processed soy products (such as tofu), nuts (including nut butters), and seeds. Make protein choices no more than 1/4 of your plate. Meat and poultry choices should be lean or  low fat.    Dairy. The Dairy Group includes all fluid milk products and foods made from milk that contain calcium, such as yogurt and cheese. (Foods that have little calcium, such as cream, butter, and cream cheese, are not part of this group.) Most dairy choices should be low-fat or fat-free.    Oils. Oils aren't a food group, but they do contain essential nutrients. However it's important to watch your intake of oils. These are fats that are liquid at room temperature. They include canola, corn, olive, soybean, vegetable, and sunflower oil. Foods that are mainly oil include mayonnaise, certain salad dressings, and soft margarines. You likely already get your daily oil allowance from the foods you eat.  Things to limit  Eating healthy also means limiting these things in your diet:       Salt (sodium). Many processed foods have a lot of sodium. To keep sodium intake down, eat fresh vegetables, meats, poultry, and seafood when possible. Purchase low-sodium, reduced-sodium, or no-salt-added food products at the store. And don't add salt to your meals at home. Instead, season them with herbs and spices such as dill, oregano, cumin, and paprika. Or try adding flavor with lemon or lime zest and juice.    Saturated fat. Saturated fats are most often found in animal products such as beef, pork, and chicken. They are often solid at room temperature, such as butter. To reduce your saturated fat intake, choose leaner cuts of meat and poultry. And try healthier cooking methods such as grilling, broiling, roasting, or baking. For a simple lower-fat swap, use plain nonfat yogurt instead of mayonnaise when making potato salad or macaroni salad.    Added sugars. These are sugars added to foods. They are in foods such as ice cream, candy, soda, fruit drinks, sports drinks, energy drinks, cookies, pastries, jams, and syrups. Cut down on added sugars by sharing sweet treats with a family member or friend. You can also choose fruit  for dessert, and drink water or other unsweetened beverages.     [employer]Well last reviewed this educational content on 6/1/2020 2000-2021 [employer]. All rights reserved. This information is not intended as a substitute for professional medical care. Always follow your healthcare professional's instructions.          Signs of Hearing Loss      Hearing much better with one ear can be a sign of hearing loss.   Hearing loss is a problem shared by many people. In fact, it is one of the most common health problems, particularly as people age. Most people age 65 and older have some hearing loss. By age 80, almost everyone does. Hearing loss often occurs slowly over the years. So you may not realize your hearing has gotten worse.  Have your hearing checked  Call your healthcare provider if you:    Have to strain to hear normal conversation    Have to watch other people s faces very carefully to follow what they re saying    Need to ask people to repeat what they ve said    Often misunderstand what people are saying    Turn the volume of the television or radio up so high that others complain    Feel that people are mumbling when they re talking to you    Find that the effort to hear leaves you feeling tired and irritated    Notice, when using the phone, that you hear better with one ear than the other  [employer] last reviewed this educational content on 1/1/2020 2000-2021 [employer]. All rights reserved. This information is not intended as a substitute for professional medical care. Always follow your healthcare professional's instructions.

## 2021-08-05 NOTE — RESULT ENCOUNTER NOTE
Hello Duane    Your results were normal. Cholesterol and glucose up just a little bit but this was because not completely fasting.     The results are attached for your review.       Josue Eng PA-C

## 2021-10-23 ENCOUNTER — HEALTH MAINTENANCE LETTER (OUTPATIENT)
Age: 71
End: 2021-10-23

## 2022-10-09 ENCOUNTER — HEALTH MAINTENANCE LETTER (OUTPATIENT)
Age: 72
End: 2022-10-09

## 2023-10-28 ENCOUNTER — HEALTH MAINTENANCE LETTER (OUTPATIENT)
Age: 73
End: 2023-10-28

## 2024-12-21 ENCOUNTER — HEALTH MAINTENANCE LETTER (OUTPATIENT)
Age: 74
End: 2024-12-21

## (undated) RX ORDER — FENTANYL CITRATE 50 UG/ML
INJECTION, SOLUTION INTRAMUSCULAR; INTRAVENOUS
Status: DISPENSED
Start: 2017-04-03